# Patient Record
Sex: FEMALE | Race: WHITE | NOT HISPANIC OR LATINO | Employment: UNEMPLOYED | URBAN - NONMETROPOLITAN AREA
[De-identification: names, ages, dates, MRNs, and addresses within clinical notes are randomized per-mention and may not be internally consistent; named-entity substitution may affect disease eponyms.]

---

## 2019-01-07 LAB
EXTERNAL CHLAMYDIA SCREEN: NEGATIVE
EXTERNAL GONORRHEA SCREEN: NEGATIVE
EXTERNAL HEPATITIS B SURFACE ANTIGEN: NEGATIVE
EXTERNAL RUBELLA QUALITATIVE: NORMAL
EXTERNAL SYPHILIS RPR SCREEN: NORMAL
HIV1 P24 AG SERPL QL IA: NORMAL

## 2019-06-18 LAB — EXTERNAL GROUP B STREP ANTIGEN: NEGATIVE

## 2019-07-15 PROCEDURE — 86658 ENTEROVIRUS ANTIBODY: CPT | Performed by: NURSE PRACTITIONER

## 2019-07-15 PROCEDURE — 87252 VIRUS INOCULATION TISSUE: CPT | Performed by: NURSE PRACTITIONER

## 2019-07-22 ENCOUNTER — LAB REQUISITION (OUTPATIENT)
Dept: LAB | Facility: HOSPITAL | Age: 39
End: 2019-07-22

## 2019-07-22 DIAGNOSIS — Z00.00 ENCOUNTER FOR GENERAL ADULT MEDICAL EXAMINATION WITHOUT ABNORMAL FINDINGS: ICD-10-CM

## 2019-07-22 DIAGNOSIS — O61.0 FAILED MEDICAL INDUCTION OF LABOR: Primary | ICD-10-CM

## 2019-07-22 LAB
ABO GROUP BLD: NORMAL
BASOPHILS # BLD AUTO: 0.03 10*3/MM3 (ref 0–0.2)
BASOPHILS NFR BLD AUTO: 0.3 % (ref 0–2)
BLD GP AB SCN SERPL QL: NEGATIVE
DEPRECATED RDW RBC AUTO: 46.2 FL (ref 40–54)
EOSINOPHIL # BLD AUTO: 0.05 10*3/MM3 (ref 0–0.7)
EOSINOPHIL NFR BLD AUTO: 0.5 % (ref 0–4)
ERYTHROCYTE [DISTWIDTH] IN BLOOD BY AUTOMATED COUNT: 15.5 % (ref 12–15)
HCT VFR BLD AUTO: 38 % (ref 37–47)
HGB BLD-MCNC: 11.7 G/DL (ref 12–16)
IMM GRANULOCYTES # BLD AUTO: 0.1 10*3/MM3 (ref 0–0.05)
IMM GRANULOCYTES NFR BLD AUTO: 1.1 % (ref 0–5)
LYMPHOCYTES # BLD AUTO: 1.85 10*3/MM3 (ref 0.72–4.86)
LYMPHOCYTES NFR BLD AUTO: 19.9 % (ref 15–45)
MCH RBC QN AUTO: 25.5 PG (ref 28–32)
MCHC RBC AUTO-ENTMCNC: 30.8 G/DL (ref 33–36)
MCV RBC AUTO: 83 FL (ref 82–98)
MONOCYTES # BLD AUTO: 0.58 10*3/MM3 (ref 0.19–1.3)
MONOCYTES NFR BLD AUTO: 6.2 % (ref 4–12)
NEUTROPHILS # BLD AUTO: 6.7 10*3/MM3 (ref 1.87–8.4)
NEUTROPHILS NFR BLD AUTO: 72 % (ref 39–78)
NRBC BLD AUTO-RTO: 0 /100 WBC (ref 0–0.2)
PLATELET # BLD AUTO: 226 10*3/MM3 (ref 130–400)
PMV BLD AUTO: 11.8 FL (ref 6–12)
RBC # BLD AUTO: 4.58 10*6/MM3 (ref 4.2–5.4)
RH BLD: POSITIVE
T&S EXPIRATION DATE: NORMAL
WBC NRBC COR # BLD: 9.31 10*3/MM3 (ref 4.8–10.8)

## 2019-07-22 PROCEDURE — 86900 BLOOD TYPING SEROLOGIC ABO: CPT | Performed by: OBSTETRICS & GYNECOLOGY

## 2019-07-22 PROCEDURE — 86901 BLOOD TYPING SEROLOGIC RH(D): CPT | Performed by: OBSTETRICS & GYNECOLOGY

## 2019-07-22 PROCEDURE — 86850 RBC ANTIBODY SCREEN: CPT | Performed by: OBSTETRICS & GYNECOLOGY

## 2019-07-22 PROCEDURE — 85025 COMPLETE CBC W/AUTO DIFF WBC: CPT | Performed by: OBSTETRICS & GYNECOLOGY

## 2019-07-22 RX ORDER — OXYTOCIN/0.9 % SODIUM CHLORIDE 30/500 ML
2 PLASTIC BAG, INJECTION (ML) INTRAVENOUS
Status: CANCELLED | OUTPATIENT
Start: 2019-07-22

## 2019-07-22 RX ORDER — SODIUM CHLORIDE 0.9 % (FLUSH) 0.9 %
3 SYRINGE (ML) INJECTION EVERY 12 HOURS SCHEDULED
Status: CANCELLED | OUTPATIENT
Start: 2019-07-22

## 2019-07-22 RX ORDER — SODIUM CHLORIDE 0.9 % (FLUSH) 0.9 %
3-10 SYRINGE (ML) INJECTION AS NEEDED
Status: CANCELLED | OUTPATIENT
Start: 2019-07-22

## 2019-07-22 RX ORDER — METHYLERGONOVINE MALEATE 0.2 MG/ML
200 INJECTION INTRAVENOUS ONCE AS NEEDED
Status: CANCELLED | OUTPATIENT
Start: 2019-07-22

## 2019-07-22 RX ORDER — CARBOPROST TROMETHAMINE 250 UG/ML
250 INJECTION, SOLUTION INTRAMUSCULAR AS NEEDED
Status: CANCELLED | OUTPATIENT
Start: 2019-07-22

## 2019-07-22 RX ORDER — LIDOCAINE HYDROCHLORIDE 10 MG/ML
5 INJECTION, SOLUTION EPIDURAL; INFILTRATION; INTRACAUDAL; PERINEURAL AS NEEDED
Status: CANCELLED | OUTPATIENT
Start: 2019-07-22

## 2019-07-22 RX ORDER — ONDANSETRON 2 MG/ML
4 INJECTION INTRAMUSCULAR; INTRAVENOUS EVERY 6 HOURS PRN
Status: CANCELLED | OUTPATIENT
Start: 2019-07-22 | End: 2019-07-24

## 2019-07-22 RX ORDER — MISOPROSTOL 200 UG/1
800 TABLET ORAL AS NEEDED
Status: CANCELLED | OUTPATIENT
Start: 2019-07-22

## 2019-07-22 NOTE — H&P
"      Patient Care Team:  Provider, No Known as PCP - General      History of Present Illness   This is a post date pregnancy admitted for induction of labor    Review of Systems   Constitutional: Positive for activity change and fatigue.   HENT: Negative.    Eyes: Negative.    Respiratory: Negative.    Cardiovascular: Positive for leg swelling.   Gastrointestinal: Negative.    Endocrine: Negative.    Genitourinary: Negative.    Musculoskeletal: Negative.    Skin: Negative.    Allergic/Immunologic: Negative.    Neurological: Negative.    Hematological: Negative.    Psychiatric/Behavioral: Negative.         Past Medical History:  Denies  Past Surgical History:  Denies  Past Gyn History:  Denies  OB History:    FT   Family History:  CAD  Social History     Tobacco Use   • Smoking status: Never Smoker   • Smokeless tobacco: Never Used   Substance Use Topics   • Alcohol use: No     Frequency: Never   • Drug use: No       (Not in a hospital admission)  Allergies:  Patient has no known allergies.  Medications:  PNV    Objective      Vital Signs  BP:  140/88  P;  68  R:  15  Ht:  64\"  Wt:  192       Physical Exam   Constitutional: She is oriented to person, place, and time. She appears well-developed and well-nourished.   HENT:   Head: Atraumatic.   Eyes: EOM are normal.   Neck: Normal range of motion. Neck supple.   Cardiovascular: Normal rate, regular rhythm, normal heart sounds and intact distal pulses.   Pulmonary/Chest: Effort normal and breath sounds normal.   Abdominal: Soft. Bowel sounds are normal.   Genitourinary: Vagina normal.   Genitourinary Comments: Bimanual Exam:  /-2 head well applied   Musculoskeletal: She exhibits edema.   Neurological: She is alert and oriented to person, place, and time.   Skin: Skin is warm and dry.   Psychiatric: She has a normal mood and affect. Her behavior is normal. Judgment and thought content normal.       Results Review:   GBS negative      Assessment & Plan "     Assessment:  1.  Post dates pregnancy  2.  AMA    Plan:  1.  IOL    I discussed the patients findings and my recommendations with patient    Clau Lopez DO  07/22/19  1:08 PM

## 2019-07-24 ENCOUNTER — HOSPITAL ENCOUNTER (INPATIENT)
Facility: HOSPITAL | Age: 39
LOS: 2 days | Discharge: HOME | End: 2019-07-26
Attending: OBSTETRICS & GYNECOLOGY | Admitting: OBSTETRICS & GYNECOLOGY

## 2019-07-24 PROBLEM — O47.9 THREATENED LABOR AT TERM: Status: ACTIVE | Noted: 2019-07-24

## 2019-07-24 PROCEDURE — 0KQM0ZZ REPAIR PERINEUM MUSCLE, OPEN APPROACH: ICD-10-PCS | Performed by: OBSTETRICS & GYNECOLOGY

## 2019-07-24 PROCEDURE — 25010000002 BUTORPHANOL PER 1 MG: Performed by: OBSTETRICS & GYNECOLOGY

## 2019-07-24 RX ORDER — OXYTOCIN/0.9 % SODIUM CHLORIDE 30/500 ML
999 PLASTIC BAG, INJECTION (ML) INTRAVENOUS ONCE
Status: DISCONTINUED | OUTPATIENT
Start: 2019-07-24 | End: 2019-07-24 | Stop reason: HOSPADM

## 2019-07-24 RX ORDER — MISOPROSTOL 200 UG/1
800 TABLET ORAL AS NEEDED
Status: DISCONTINUED | OUTPATIENT
Start: 2019-07-24 | End: 2019-07-24 | Stop reason: HOSPADM

## 2019-07-24 RX ORDER — ONDANSETRON 4 MG/1
4 TABLET, FILM COATED ORAL EVERY 8 HOURS PRN
Status: DISCONTINUED | OUTPATIENT
Start: 2019-07-24 | End: 2019-07-25 | Stop reason: SDUPTHER

## 2019-07-24 RX ORDER — PROMETHAZINE HYDROCHLORIDE 12.5 MG/1
12.5 SUPPOSITORY RECTAL EVERY 6 HOURS PRN
Status: DISCONTINUED | OUTPATIENT
Start: 2019-07-24 | End: 2019-07-25

## 2019-07-24 RX ORDER — PROMETHAZINE HYDROCHLORIDE 25 MG/ML
12.5 INJECTION, SOLUTION INTRAMUSCULAR; INTRAVENOUS EVERY 6 HOURS PRN
Status: DISCONTINUED | OUTPATIENT
Start: 2019-07-24 | End: 2019-07-25

## 2019-07-24 RX ORDER — BISACODYL 10 MG
10 SUPPOSITORY, RECTAL RECTAL DAILY PRN
Status: DISCONTINUED | OUTPATIENT
Start: 2019-07-25 | End: 2019-07-26 | Stop reason: HOSPADM

## 2019-07-24 RX ORDER — PROMETHAZINE HYDROCHLORIDE 25 MG/1
25 TABLET ORAL EVERY 6 HOURS PRN
Status: DISCONTINUED | OUTPATIENT
Start: 2019-07-24 | End: 2019-07-26 | Stop reason: HOSPADM

## 2019-07-24 RX ORDER — OXYTOCIN/0.9 % SODIUM CHLORIDE 30/500 ML
125 PLASTIC BAG, INJECTION (ML) INTRAVENOUS CONTINUOUS PRN
Status: COMPLETED | OUTPATIENT
Start: 2019-07-24 | End: 2019-07-24

## 2019-07-24 RX ORDER — METHYLERGONOVINE MALEATE 0.2 MG/ML
200 INJECTION INTRAVENOUS ONCE
Status: DISCONTINUED | OUTPATIENT
Start: 2019-07-24 | End: 2019-07-25

## 2019-07-24 RX ORDER — CARBOPROST TROMETHAMINE 250 UG/ML
250 INJECTION, SOLUTION INTRAMUSCULAR AS NEEDED
Status: DISCONTINUED | OUTPATIENT
Start: 2019-07-24 | End: 2019-07-24 | Stop reason: HOSPADM

## 2019-07-24 RX ORDER — SODIUM CHLORIDE 0.9 % (FLUSH) 0.9 %
3 SYRINGE (ML) INJECTION EVERY 12 HOURS SCHEDULED
Status: DISCONTINUED | OUTPATIENT
Start: 2019-07-24 | End: 2019-07-25

## 2019-07-24 RX ORDER — CARBOPROST TROMETHAMINE 250 UG/ML
250 INJECTION, SOLUTION INTRAMUSCULAR ONCE
Status: DISCONTINUED | OUTPATIENT
Start: 2019-07-24 | End: 2019-07-25

## 2019-07-24 RX ORDER — CALCIUM CARBONATE 200(500)MG
2 TABLET,CHEWABLE ORAL 3 TIMES DAILY PRN
Status: DISCONTINUED | OUTPATIENT
Start: 2019-07-24 | End: 2019-07-26 | Stop reason: HOSPADM

## 2019-07-24 RX ORDER — SODIUM CHLORIDE 0.9 % (FLUSH) 0.9 %
3-10 SYRINGE (ML) INJECTION AS NEEDED
Status: DISCONTINUED | OUTPATIENT
Start: 2019-07-24 | End: 2019-07-25

## 2019-07-24 RX ORDER — LIDOCAINE HYDROCHLORIDE 20 MG/ML
INJECTION, SOLUTION INFILTRATION; PERINEURAL
Status: COMPLETED
Start: 2019-07-24 | End: 2019-07-24

## 2019-07-24 RX ORDER — ONDANSETRON 4 MG/1
4 TABLET, FILM COATED ORAL EVERY 6 HOURS PRN
Status: DISCONTINUED | OUTPATIENT
Start: 2019-07-24 | End: 2019-07-26 | Stop reason: HOSPADM

## 2019-07-24 RX ORDER — ONDANSETRON 2 MG/ML
4 INJECTION INTRAMUSCULAR; INTRAVENOUS EVERY 6 HOURS PRN
Status: DISCONTINUED | OUTPATIENT
Start: 2019-07-24 | End: 2019-07-25

## 2019-07-24 RX ORDER — IBUPROFEN 800 MG/1
800 TABLET ORAL EVERY 6 HOURS PRN
Status: DISCONTINUED | OUTPATIENT
Start: 2019-07-24 | End: 2019-07-26 | Stop reason: HOSPADM

## 2019-07-24 RX ORDER — DOCUSATE SODIUM 100 MG/1
100 CAPSULE, LIQUID FILLED ORAL 2 TIMES DAILY
Status: DISCONTINUED | OUTPATIENT
Start: 2019-07-25 | End: 2019-07-26 | Stop reason: HOSPADM

## 2019-07-24 RX ORDER — OXYCODONE HYDROCHLORIDE AND ACETAMINOPHEN 5; 325 MG/1; MG/1
1 TABLET ORAL EVERY 4 HOURS PRN
Status: DISCONTINUED | OUTPATIENT
Start: 2019-07-24 | End: 2019-07-26 | Stop reason: HOSPADM

## 2019-07-24 RX ORDER — BUTORPHANOL TARTRATE 1 MG/ML
1 INJECTION, SOLUTION INTRAMUSCULAR; INTRAVENOUS
Status: DISCONTINUED | OUTPATIENT
Start: 2019-07-24 | End: 2019-07-24

## 2019-07-24 RX ORDER — METHYLERGONOVINE MALEATE 0.2 MG/ML
200 INJECTION INTRAVENOUS ONCE AS NEEDED
Status: DISCONTINUED | OUTPATIENT
Start: 2019-07-24 | End: 2019-07-24 | Stop reason: HOSPADM

## 2019-07-24 RX ORDER — LIDOCAINE HYDROCHLORIDE 10 MG/ML
5 INJECTION, SOLUTION EPIDURAL; INFILTRATION; INTRACAUDAL; PERINEURAL AS NEEDED
Status: DISCONTINUED | OUTPATIENT
Start: 2019-07-24 | End: 2019-07-25

## 2019-07-24 RX ORDER — MISOPROSTOL 200 UG/1
600 TABLET ORAL ONCE
Status: DISCONTINUED | OUTPATIENT
Start: 2019-07-24 | End: 2019-07-25

## 2019-07-24 RX ORDER — SODIUM CHLORIDE, SODIUM LACTATE, POTASSIUM CHLORIDE, CALCIUM CHLORIDE 600; 310; 30; 20 MG/100ML; MG/100ML; MG/100ML; MG/100ML
125 INJECTION, SOLUTION INTRAVENOUS CONTINUOUS
Status: DISCONTINUED | OUTPATIENT
Start: 2019-07-24 | End: 2019-07-25

## 2019-07-24 RX ORDER — SODIUM CHLORIDE 0.9 % (FLUSH) 0.9 %
1-10 SYRINGE (ML) INJECTION AS NEEDED
Status: DISCONTINUED | OUTPATIENT
Start: 2019-07-24 | End: 2019-07-25

## 2019-07-24 RX ORDER — OXYTOCIN/0.9 % SODIUM CHLORIDE 30/500 ML
250 PLASTIC BAG, INJECTION (ML) INTRAVENOUS CONTINUOUS
Status: DISPENSED | OUTPATIENT
Start: 2019-07-24 | End: 2019-07-24

## 2019-07-24 RX ADMIN — OXYTOCIN-SODIUM CHLORIDE 0.9% IV SOLN 30 UNIT/500ML 125 ML/HR: 30-0.9/5 SOLUTION at 05:45

## 2019-07-24 RX ADMIN — BUTORPHANOL TARTRATE 1 MG: 1 INJECTION, SOLUTION INTRAMUSCULAR; INTRAVENOUS at 03:50

## 2019-07-24 RX ADMIN — OXYCODONE HYDROCHLORIDE AND ACETAMINOPHEN 1 TABLET: 5; 325 TABLET ORAL at 23:34

## 2019-07-24 RX ADMIN — IBUPROFEN 800 MG: 800 TABLET, FILM COATED ORAL at 19:40

## 2019-07-24 RX ADMIN — SODIUM CHLORIDE, POTASSIUM CHLORIDE, SODIUM LACTATE AND CALCIUM CHLORIDE 125 ML/HR: 600; 310; 30; 20 INJECTION, SOLUTION INTRAVENOUS at 02:20

## 2019-07-24 RX ADMIN — BUTORPHANOL TARTRATE 1 MG: 1 INJECTION, SOLUTION INTRAMUSCULAR; INTRAVENOUS at 05:30

## 2019-07-24 RX ADMIN — LIDOCAINE HYDROCHLORIDE 20 ML: 20 INJECTION, SOLUTION INFILTRATION; PERINEURAL at 05:30

## 2019-07-24 RX ADMIN — IBUPROFEN 800 MG: 800 TABLET, FILM COATED ORAL at 12:57

## 2019-07-25 LAB
DEPRECATED RDW RBC AUTO: 46.2 FL (ref 40–54)
ERYTHROCYTE [DISTWIDTH] IN BLOOD BY AUTOMATED COUNT: 15.7 % (ref 12–15)
HCT VFR BLD AUTO: 28.9 % (ref 37–47)
HGB BLD-MCNC: 9 G/DL (ref 12–16)
MCH RBC QN AUTO: 25.4 PG (ref 28–32)
MCHC RBC AUTO-ENTMCNC: 31.1 G/DL (ref 33–36)
MCV RBC AUTO: 81.6 FL (ref 82–98)
PLATELET # BLD AUTO: 185 10*3/MM3 (ref 130–400)
PMV BLD AUTO: 11.6 FL (ref 6–12)
RBC # BLD AUTO: 3.54 10*6/MM3 (ref 4.2–5.4)
WBC NRBC COR # BLD: 8.48 10*3/MM3 (ref 4.8–10.8)

## 2019-07-25 PROCEDURE — 85027 COMPLETE CBC AUTOMATED: CPT | Performed by: OBSTETRICS & GYNECOLOGY

## 2019-07-25 RX ADMIN — DOCUSATE SODIUM 100 MG: 100 CAPSULE ORAL at 08:24

## 2019-07-25 RX ADMIN — Medication 1 TABLET: at 14:08

## 2019-07-25 RX ADMIN — IBUPROFEN 800 MG: 800 TABLET, FILM COATED ORAL at 15:26

## 2019-07-25 RX ADMIN — POLYETHYLENE GLYCOL 3350 17 G: 17 POWDER, FOR SOLUTION ORAL at 08:24

## 2019-07-25 RX ADMIN — OXYCODONE HYDROCHLORIDE AND ACETAMINOPHEN 1 TABLET: 5; 325 TABLET ORAL at 16:15

## 2019-07-25 RX ADMIN — IBUPROFEN 800 MG: 800 TABLET, FILM COATED ORAL at 06:59

## 2019-07-25 RX ADMIN — DOCUSATE SODIUM 100 MG: 100 CAPSULE ORAL at 20:01

## 2019-07-25 RX ADMIN — IBUPROFEN 800 MG: 800 TABLET, FILM COATED ORAL at 21:05

## 2019-07-26 VITALS
WEIGHT: 191.6 LBS | RESPIRATION RATE: 16 BRPM | HEART RATE: 84 BPM | OXYGEN SATURATION: 99 % | SYSTOLIC BLOOD PRESSURE: 116 MMHG | BODY MASS INDEX: 33.95 KG/M2 | HEIGHT: 63 IN | DIASTOLIC BLOOD PRESSURE: 68 MMHG | TEMPERATURE: 97.3 F

## 2019-07-26 PROBLEM — O47.9 THREATENED LABOR AT TERM: Status: RESOLVED | Noted: 2019-07-24 | Resolved: 2019-07-26

## 2019-07-26 RX ORDER — IBUPROFEN 800 MG/1
800 TABLET ORAL EVERY 6 HOURS PRN
Qty: 50 TABLET | Refills: 0 | Status: SHIPPED | OUTPATIENT
Start: 2019-07-26 | End: 2019-08-25

## 2019-07-26 RX ORDER — OXYCODONE HYDROCHLORIDE AND ACETAMINOPHEN 5; 325 MG/1; MG/1
1 TABLET ORAL EVERY 4 HOURS PRN
Qty: 20 TABLET | Refills: 0 | Status: SHIPPED | OUTPATIENT
Start: 2019-07-26 | End: 2019-08-03

## 2019-07-26 RX ADMIN — Medication: at 09:04

## 2019-07-26 RX ADMIN — POLYETHYLENE GLYCOL 3350 17 G: 17 POWDER, FOR SOLUTION ORAL at 08:54

## 2019-07-26 RX ADMIN — DOCUSATE SODIUM 100 MG: 100 CAPSULE ORAL at 08:54

## 2019-07-26 RX ADMIN — CALCIUM CARBONATE 2 TABLET: 500 TABLET, CHEWABLE ORAL at 09:05

## 2019-07-26 RX ADMIN — IBUPROFEN 800 MG: 800 TABLET, FILM COATED ORAL at 09:04

## 2019-07-26 RX ADMIN — Medication 1 TABLET: at 08:54

## 2019-07-26 RX ADMIN — IBUPROFEN 800 MG: 800 TABLET, FILM COATED ORAL at 02:58

## 2019-07-27 ENCOUNTER — HOSPITAL ENCOUNTER (OUTPATIENT)
Dept: LACTATION | Facility: HOSPITAL | Age: 39
Discharge: HOME | End: 2019-07-27

## 2019-07-27 NOTE — LACTATION NOTE
"Mother's Name: Yolie \"Bryan\"  Contact Number:[phone]  G/P:2/2  Breastfeeding Hx: Breast fed first child for 10 months - milk dried up with second pregnancy  Significant Medical History: AMA  Maternal Breast Assessment: Breast are full, some areas of firmness especially to right breast right upper quadrant, nipples are tender, mother states nipples were bleeding yesterday but no significant bruising or scabs visible.    Infant's Name:Kailey  Date of Birth: [birth year]  Gestational age at Birth: 39.6  Age:3 days  Physician: Dr. Rondon                     Reason for Visit: Initial lactation assessment s/p discharge          Infant's Birth weight: 9-5.9 (4250g)  Previous Weight: 8-11.5 (3956g)  Wt Loss: -6.92%    Today's Weight:   8-10 (3911g) Wt Loss:-7.97%    Feeding History Since Discharge/Last Lactation Appt.:Mother states infant has  8 times in the past 24 hours. Infant initially has been nursing bilateral breast for each feeding for 15/15 but mother states infant was having emesis episodes with this. She began breastfeeding 1 side per feeding for 15 mins and infant has not had any more emesis, he is resting well after feedings.     Past 24 Hours Voids/Stools:4/4       Color of Stool: yellow    Pre Weight: 8-10.7 (3931g)           Left Breast:       8 mins  8-13.3 (4005g)      Right Breast:    16 mins  8-12 (3970g)                     Total Minutes:     24 mins        Total Weight Gain:    74      gms    Average Feeding Amount for Age: 0.5-1 oz (15-30ml)    Interventions:After AC weight obtained, infant placed in mother's arms, mother independently positions infant to right breast in football hold, infant's chin in contact with his chest, assisted with adjusting infant position to lift head. Mother independently latches infant but latch appears shallow and mother verbalized discomfort. Assisted with releasing infant from breast, demonstrated proper \"sandwiching\" of breast tissue, rolling tissue into " "infant's wide gaping mouth. Infant latched easily, flanged lips exhibited, mother verbalized continued discomfort but this improved after approximately 1 min. Infant exhibited deep jaw drops and audible swallows, required stimulation periodically for sucks, but easily stimulated. Mother was able to appropriately and independently position and latch infant to left breast in cradle hold, assisted with turning infant inward \"belly to belly\". Mother again verbalized slight discomfort with latch initially but quickly resolved. Throughout feeding infant appeared to be slipping downward, at end of feeding mother's left nipple is lipstick shaped.     Education: Well supportive pillows while nursing infant to aid in holding infant up to breast.  Practice allowing infant to gape wide to achieve deep latch  Nipple care  Pump after feedings if knots remain present  Engorgement/Mastitis    Notified MD/ Orders Received:NA    Feeding Plan: Continue nursing infant on demand or at least waking to feed every 3 hours. Offer infant bilateral breast. Continue to alternate which breast you start with.    Plan of Care:    Interventions accomplished satisfactorily, requires no further action.      Future Appointments:    Lactation: At mother's [facility]    Physician: Dr. Rondon, July 31st     Signature: Christiane Zamora, RN, Lactation nurse    Faxed to:[facility]- Dr. Rondon  Date: 7/27/19      "

## 2019-07-30 ENCOUNTER — TELEPHONE (OUTPATIENT)
Dept: OTHER | Facility: HOSPITAL | Age: 39
End: 2019-07-30

## 2019-07-30 NOTE — TELEPHONE ENCOUNTER
"Infant;  Caius (\"Maxwell-us\")  :  19  6 days    Bryan reports infant bfing well, but nipples are sore with scab to R. She is using shells and lanolin. She also admits to feeding infant with a shallow latch at times. Educated strongly against this. Described how to latch deeply and urged her to watch videos of same; relatching as often as needed to have pain-free bfing. Also suggested EBM onto sore nipples and air-drying. Offered another OP appointment if desired. Pt declined. Says infant is almost back to birth weight. Invited to [employer] bfing group. Mom states will likely come. Praised her and encouraged her to cont bfing.   "

## 2020-07-10 ENCOUNTER — TELEMEDICINE (OUTPATIENT)
Dept: OBGYN | Age: 40
End: 2020-07-10
Payer: COMMERCIAL

## 2020-07-10 PROCEDURE — 99203 OFFICE O/P NEW LOW 30 MIN: CPT | Performed by: NURSE PRACTITIONER

## 2020-07-10 RX ORDER — ONDANSETRON 4 MG/1
4 TABLET, ORALLY DISINTEGRATING ORAL EVERY 8 HOURS PRN
Qty: 45 TABLET | Refills: 3 | Status: SHIPPED | OUTPATIENT
Start: 2020-07-10 | End: 2020-09-08

## 2020-07-10 ASSESSMENT — ENCOUNTER SYMPTOMS
GASTROINTESTINAL NEGATIVE: 1
RESPIRATORY NEGATIVE: 1
EYES NEGATIVE: 1

## 2020-07-10 NOTE — PROGRESS NOTES
[facility]  Nurse Practitioner Office Note  TELEHEALTH EVALUATION -- Audio/Visual (During QVLCT-00 public health emergency)    aCmacho Velásquez is a 47 y.o. female who presents today for her medical conditions/ complaints as noted below. Chief Complaint   Patient presents with   [facility] Establish Care    Confirmation         HPI  Patient has had one +HPT  LMP 20    Other pregnancies normal, full term 's. Just finished breastfeeding her youngest. Has had some light spotting that is intermittent, but says this happened before. States blood type is O+. There is no problem list on file for this patient. No LMP recorded. [id number]    History reviewed. No pertinent past medical history. History reviewed. No pertinent surgical history. History reviewed. No pertinent family history. Social History     Tobacco Use    Smoking status: Never Smoker    Smokeless tobacco: Never Used   Substance Use Topics    Alcohol use: Not Currently       Current Outpatient Medications   Medication Sig Dispense Refill    ondansetron (ZOFRAN ODT) 4 MG disintegrating tablet Take 1 tablet by mouth every 8 hours as needed for Nausea or Vomiting 45 tablet 3     No current facility-administered medications for this visit. No Known Allergies  There were no vitals filed for this visit. There is no height or weight on file to calculate BMI. Review of Systems   Constitutional: Negative. HENT: Negative. Eyes: Negative. Respiratory: Negative. Cardiovascular: Negative. Gastrointestinal: Negative. Genitourinary: Positive for vaginal bleeding (spotting). Negative for difficulty urinating, dyspareunia, dysuria, enuresis, frequency, hematuria, menstrual problem, pelvic pain, urgency and vaginal discharge. Musculoskeletal: Negative. Skin: Negative. Neurological: Negative. Psychiatric/Behavioral: Negative.         Due to this being a TeleHealth encounter, evaluation of the following organ systems is limited: Vitals/Constitutional/EENT/Resp/CV/GI//MS/Neuro/Skin/Heme-Lymph-Imm. Physical Exam  Vitals signs and nursing note reviewed. Constitutional:       General: She is not in acute distress. Appearance: She is well-developed. She is not diaphoretic. HENT:      Head: Normocephalic and atraumatic. Eyes:      Conjunctiva/sclera: Conjunctivae normal.      Pupils: Pupils are equal, round, and reactive to light. Neck:      Musculoskeletal: Normal range of motion. Pulmonary:      Effort: Pulmonary effort is normal.   Abdominal:      Tenderness: There is no guarding. Musculoskeletal: Normal range of motion. Comments: Normal ROM in all 4 extremities; normal gait   Skin:     General: Skin is warm and dry. Neurological:      Mental Status: She is alert and oriented to person, place, and time. Motor: No abnormal muscle tone. Coordination: Coordination normal.   Psychiatric:         Behavior: Behavior normal.          Diagnosis Orders   1. Amenorrhea  HCG, QUANTITATIVE, PREGNANCY   2. Bleeding in early pregnancy  PROGESTERONE   3. Nausea and vomiting in pregnancy  ondansetron (ZOFRAN ODT) 4 MG disintegrating tablet       MEDICATIONS:  Orders Placed This Encounter   Medications    ondansetron (ZOFRAN ODT) 4 MG disintegrating tablet     Sig: Take 1 tablet by mouth every 8 hours as needed for Nausea or Vomiting     Dispense:  45 tablet     Refill:  3       ORDERS:  Orders Placed This Encounter   Procedures    HCG, QUANTITATIVE, PREGNANCY    PROGESTERONE       PLAN:  1. Pregnancy concerns discussed  2. zofran sent  3. Check quant, US and nob asap due to spotting  4.  If gets heavy over weekend go to ER     Pursuant to the emergency declaration under the [facility], 1135 waiver authority and the [employer] Act, this Virtual  Visit was conducted, with patient's consent, to reduce the patient's risk of exposure to COVID-19 and provide continuity of care for an established patient. Services were provided through a video synchronous discussion virtually to substitute for in-person clinic visit.

## 2020-07-13 ENCOUNTER — HOSPITAL ENCOUNTER (OUTPATIENT)
Dept: ULTRASOUND IMAGING | Age: 40
Discharge: HOME | End: 2020-07-13
Payer: COMMERCIAL

## 2020-07-13 ENCOUNTER — INITIAL PRENATAL (OUTPATIENT)
Dept: OBGYN | Age: 40
End: 2020-07-13

## 2020-07-13 VITALS — WEIGHT: 167 LBS | HEART RATE: 90 BPM | SYSTOLIC BLOOD PRESSURE: 119 MMHG | DIASTOLIC BLOOD PRESSURE: 73 MMHG

## 2020-07-13 DIAGNOSIS — Z36.9 ANTENATAL SCREENING ENCOUNTER: ICD-10-CM

## 2020-07-13 DIAGNOSIS — O20.9 BLEEDING IN EARLY PREGNANCY: ICD-10-CM

## 2020-07-13 DIAGNOSIS — N91.2 AMENORRHEA: ICD-10-CM

## 2020-07-13 LAB
ABO/RH: NORMAL
ANTIBODY SCREEN: NORMAL
GONADOTROPIN, CHORIONIC (HCG) QUANT: ABNORMAL MIU/ML (ref 0–5.3)
PROGESTERONE LEVEL: 20.16 NG/ML

## 2020-07-13 PROCEDURE — 76817 TRANSVAGINAL US OBSTETRIC: CPT

## 2020-07-13 PROCEDURE — 0500F INITIAL PRENATAL CARE VISIT: CPT | Performed by: NURSE PRACTITIONER

## 2020-07-13 NOTE — PATIENT INSTRUCTIONS
Pregnancy: Care Instructions. \"     If you do not have an account, please click on the \"Sign Up Now\" link. Current as of: February 11, 2020               Content Version: 12.5  © 6307-2837 Healthwise, Incorporated. Care instructions adapted under license by [facility] ([facility]). If you have questions about a medical condition or this instruction, always ask your healthcare professional. [address] any warranty or liability for your use of this information.

## 2020-07-13 NOTE — PROGRESS NOTES
Culture, Urine    C.trachomatis N.gonorrhoeae DNA    Varicella Zoster Antibody, IgG   3. 8 weeks gestation of pregnancy     4. Nausea and vomiting in pregnancy         P:   Pt counseled on cata, continue nausea med and Genetic testing  Continue with routine prenatal care. RTC in 4 wk for prenatal visit in office for panorama    MEDICATIONS:  No orders of the defined types were placed in this encounter.       ORDERS:  Orders Placed This Encounter   Procedures    Culture, Urine    C.trachomatis N.gonorrhoeae DNA    HIV Obstetric Panel    Varicella Zoster Antibody, IgG

## 2020-07-14 ENCOUNTER — PATIENT MESSAGE (OUTPATIENT)
Dept: OBGYN | Age: 40
End: 2020-07-14

## 2020-07-14 NOTE — TELEPHONE ENCOUNTER
From: Edin Mcghee  To: Sulaiman PerryANG  Sent: 7/14/2020 1:43 PM CDT  Subject: Non-Urgent Medical Question    The lady at the desk, Priyanka Willett I think, asked me to message in [employer] about billing that she could send the information to me through here. I would like a breakdown of what I will be charged after I deliver. I do realize tests prior and any extra ultrasounds will be separate. But we'd like to set our expectations now before the delivery.      Thanks,   Edin Mcghee

## 2020-07-14 NOTE — TELEPHONE ENCOUNTER
Susan: The estimated cost for a vaginal delivery is $2115.00. The estimated cost for a c/section is $ 4672.00    You may want to contact your insurance company and see if they can tell you how much they will cover.      Chloe Salazar, [address] OB/GYN

## 2020-07-16 LAB
BASOPHILS ABSOLUTE: 0 K/UL (ref 0–0.2)
BASOPHILS RELATIVE PERCENT: 0.2 % (ref 0–1)
EOSINOPHILS ABSOLUTE: 0.1 K/UL (ref 0–0.6)
EOSINOPHILS RELATIVE PERCENT: 0.9 % (ref 0–5)
HCT VFR BLD CALC: 42.8 % (ref 37–47)
HEMOGLOBIN: 13.8 G/DL (ref 12–16)
HEPATITIS B SURFACE ANTIGEN INTERPRETATION: ABNORMAL
HIV-1 P24 AG: ABNORMAL
IMMATURE GRANULOCYTES #: 0 K/UL
LYMPHOCYTES ABSOLUTE: 1.6 K/UL (ref 1.1–4.5)
LYMPHOCYTES RELATIVE PERCENT: 18.7 % (ref 20–40)
MCH RBC QN AUTO: 28.8 PG (ref 27–31)
MCHC RBC AUTO-ENTMCNC: 32.2 G/DL (ref 33–37)
MCV RBC AUTO: 89.2 FL (ref 81–99)
MONOCYTES ABSOLUTE: 0.5 K/UL (ref 0–0.9)
MONOCYTES RELATIVE PERCENT: 5.5 % (ref 0–10)
NEUTROPHILS ABSOLUTE: 6.3 K/UL (ref 1.5–7.5)
NEUTROPHILS RELATIVE PERCENT: 74.5 % (ref 50–65)
PDW BLD-RTO: 12.5 % (ref 11.5–14.5)
PLATELET # BLD: 308 K/UL (ref 130–400)
PMV BLD AUTO: 9.9 FL (ref 9.4–12.3)
RAPID HIV 1&2: ABNORMAL
RBC # BLD: 4.8 M/UL (ref 4.2–5.4)
RPR: ABNORMAL
RUBELLA ANTIBODY IGG: REACTIVE
WBC # BLD: 8.5 K/UL (ref 4.8–10.8)

## 2020-07-20 LAB — VZV IGG SER QL IA: 2.52

## 2020-08-11 ENCOUNTER — ROUTINE PRENATAL (OUTPATIENT)
Dept: OBGYN | Age: 40
End: 2020-08-11

## 2020-08-11 VITALS
SYSTOLIC BLOOD PRESSURE: 128 MMHG | HEIGHT: 64 IN | DIASTOLIC BLOOD PRESSURE: 70 MMHG | BODY MASS INDEX: 28.51 KG/M2 | HEART RATE: 88 BPM | WEIGHT: 167 LBS

## 2020-08-11 PROCEDURE — 0502F SUBSEQUENT PRENATAL CARE: CPT | Performed by: OBSTETRICS & GYNECOLOGY

## 2020-08-11 RX ORDER — DOXYLAMINE SUCCINATE AND PYRIDOXINE HYDROCHLORIDE, DELAYED RELEASE TABLETS 10 MG/10 MG 10; 10 MG/1; MG/1
TABLET, DELAYED RELEASE ORAL
Qty: 120 TABLET | Refills: 5 | Status: SHIPPED | OUTPATIENT
Start: 2020-08-11 | End: 2020-09-08

## 2020-08-11 RX ORDER — ONDANSETRON 4 MG/1
4 TABLET, ORALLY DISINTEGRATING ORAL EVERY 8 HOURS PRN
Qty: 30 TABLET | Refills: 5 | Status: SHIPPED | OUTPATIENT
Start: 2020-08-11 | End: 2020-09-08

## 2020-08-11 NOTE — PATIENT INSTRUCTIONS
thickness of the area at the back of the baby's neck. An increase in the thickness can be an early sign of Down syndrome. ? Chorionic villus sampling (CVS). This is a test that looks for certain genetic problems with your baby. The same genes that are in your baby are in the placenta. A small piece of the placenta is taken out and tested. This test is done when you are 10 to 13 weeks pregnant. Ease discomfort  · Slow down and take naps when you feel tired. · If your emotions swing, talk to someone. Crying, anxiety, and concentration problems are common. · If your gums bleed, try a softer toothbrush. If your gums are puffy and bleed a lot, see your dentist.  · If you feel dizzy:  ? Get up slowly after sitting or lying down. ? Drink plenty of fluids. ? Eat small snacks to keep your blood sugar stable. ? Put your head between your legs as though you were tying your shoelaces. ? Lie down with your legs higher than your head. Use pillows to prop up your feet. · If you have a headache:  ? Lie down. ? Ask your partner or a good friend for a neck massage. ? Try cool cloths over your forehead or across the back of your neck. ? Use acetaminophen (Tylenol) for pain relief. Do not use nonsteroidal anti-inflammatory drugs (NSAIDs), such as ibuprofen (Advil, Motrin) or naproxen (Aleve), unless your doctor says it is okay. · If you have a nosebleed, pinch your nose gently, and hold it for a short while. To prevent nosebleeds, try massaging a small dab of petroleum jelly, such as Vaseline, in your nostrils. · If your nose is stuffed up, try saline (saltwater) nose sprays. Do not use decongestant sprays. Care for your breasts  · Wear a bra that gives you good support. · Know that changes in your breasts are normal.  ? Your breasts may get larger and more tender. Tenderness usually gets better by 12 weeks. ? Your nipples may get darker and larger, and small bumps around your nipples may show more. ?  The veins in your chest and breasts may show more. · Don't worry about \"toughening'\" your nipples. Breastfeeding will naturally do this. Where can you learn more? Go to https://[email or web address] org and sign in to your CSR account. Enter N606 in the [employer] box to learn more about \"Weeks 10 to 14 of Your Pregnancy: Care Instructions. \"     If you do not have an account, please click on the \"Sign Up Now\" link. Current as of: February 11, 2020               Content Version: 12.5  © 3195-4642 Healthwise, Incorporated. Care instructions adapted under license by [facility] ([facility]). If you have questions about a medical condition or this instruction, always ask your healthcare professional. [address] any warranty or liability for your use of this information.

## 2020-08-11 NOTE — PROGRESS NOTES
Pt presents today for a routine prenatal visit. Pt denies vaginal bleeding, leaking of fluid or cramping.  Genetic testing today

## 2020-08-11 NOTE — PROGRESS NOTES
I, Anna Chou RN, am scribing for and in the presence of Dr. Bailey Fall 8/11/2020/4:26 PM/sign      Subjective:  Nilam Roy is here for a return obstetrical visit. Today she is 12w6d weeks EGA. She is having N/V. She  does not have vaginal bleeding, dizziness, or cramping. Objective: Mother's Prenatal Vitals  BP: 128/70  Weight: 167 lb (75.8 kg)  Pulse: 88  Patient Position: Sitting  Prenatal Fetal Information  Fetal Heart Rate: 166  Movement: Absent  Pt is A&Ox3, in no acute distress. Normocephalic, atraumatic. PERRL. Resp even and non-labored. Skin pink, warm & dry. Gravid abdomen. THOMAS's well. Gait steady. Assessment:    IUP at 12w6d wks      Diagnosis Orders   1. Antepartum multigravida of advanced maternal age     3. Nausea and vomiting in pregnancy       Plan:  Pt counseled on SAB and genetic testing. Will call Toan Tillman about [facility] testing cost. Will call if she desires testing. If not covered then will draw Quad screen  Zofran and Diclegis rx sent in  Continue with routine prenatal care. RTC in 4 wks for prenatal visit  MEDICATIONS:  Orders Placed This Encounter   Medications    doxylamine-pyridoxine 10-10 MG TBEC     Sig: Take 1 tab by mouth nightly at bedtime. May take 1 tab in am and 1 tab in afternoon if needed. Dispense:  120 tablet     Refill:  5    ondansetron (ZOFRAN ODT) 4 MG disintegrating tablet     Sig: Take 1 tablet by mouth every 8 hours as needed for Nausea or Vomiting     Dispense:  30 tablet     Refill:  5     ORDERS:  No orders of the defined types were placed in this encounter. Hayden Duenas MD, personally performed services described in this document as scribed by Tim Canchola RN in my presence, and it is both accurate and complete.

## 2020-09-08 ENCOUNTER — ROUTINE PRENATAL (OUTPATIENT)
Dept: OBGYN | Age: 40
End: 2020-09-08

## 2020-09-08 VITALS
HEART RATE: 94 BPM | SYSTOLIC BLOOD PRESSURE: 117 MMHG | DIASTOLIC BLOOD PRESSURE: 73 MMHG | WEIGHT: 169 LBS | BODY MASS INDEX: 29.01 KG/M2

## 2020-09-08 PROCEDURE — 0502F SUBSEQUENT PRENATAL CARE: CPT | Performed by: NURSE PRACTITIONER

## 2020-09-08 NOTE — PATIENT INSTRUCTIONS

## 2020-09-08 NOTE — PROGRESS NOTES
Mary Ann Hernandez is here for a return obstetrical visit. Today she is 16w6d weeks EGA. She is doing well and has no complaints. She  does not have vaginal bleeding, leaking of fluid, contractions. She does not have blurred vision, SOB, or increased swelling in legs or face. Pt does not feel fetal movement regularly. Has appt scheduled for anatomical survey. Objective: Mother's Prenatal Vitals  BP: 117/73  Weight: 169 lb (76.7 kg)  Pulse: 94  Patient Position: Sitting  Alb/Glu  Albumin: Negative  Glucose: Negative  Prenatal Fetal Information  Fetal Heart Rate: 150/US  Movement: Absent  Pt is A&Ox3, in no acute distress. Normocephalic, atraumatic. PERRL. Resp even and non-labored. Skin pink, warm & dry. Gravid abdomen. THOMAS's well. Gait steady. Assessment:  IUP at 16w6d wks      Diagnosis Orders   1. 16 weeks gestation of pregnancy       Plan:Pt counseled on Kick count and  labor  Continue with routine prenatal care. RTC in 4 wk for prenatal visit    MEDICATIONS:  No orders of the defined types were placed in this encounter. ORDERS:  No orders of the defined types were placed in this encounter.

## 2020-10-13 ENCOUNTER — TELEMEDICINE (OUTPATIENT)
Dept: OBGYN | Age: 40
End: 2020-10-13

## 2020-10-13 PROCEDURE — 0502F SUBSEQUENT PRENATAL CARE: CPT | Performed by: OBSTETRICS & GYNECOLOGY

## 2020-10-13 NOTE — PROGRESS NOTES
at 21w6d wks      Diagnosis Orders   1. Multigravida of advanced maternal age in second trimester  Glucose 1 Hour Postprandial    CBC      Pt counseled on balanced nutrition, adequate fluid intake, taking PNV daily, and exercise along with GHTN precautions and  labor   Continue with routine prenatal care.  RTC in 4 wks for prenatal visit with GCT    Patient identification was verified at the start of the visit: Yes    Total time spent on this encounter: 10 mins     Pursuant to the emergency declaration under the [address] [place] [address], [facility] waiver authority and the [employer] and [employer] Act, this Virtual  Visit was conducted, with patient's consent, to reduce the patient's risk of exposure to COVID-19 and provide continuity of care for an established patient. Services were provided through a video synchronous discussion virtually to substitute for in-person clinic visit. Sudha Villarreal MD, personally performed services described in this document as scribed by Kriss Burton RN in my presence, and it is both accurate and complete.

## 2020-11-11 ENCOUNTER — ROUTINE PRENATAL (OUTPATIENT)
Dept: OBGYN | Age: 40
End: 2020-11-11
Payer: COMMERCIAL

## 2020-11-11 VITALS
HEART RATE: 100 BPM | BODY MASS INDEX: 30.21 KG/M2 | SYSTOLIC BLOOD PRESSURE: 119 MMHG | WEIGHT: 176 LBS | DIASTOLIC BLOOD PRESSURE: 73 MMHG

## 2020-11-11 DIAGNOSIS — O09.522 MULTIGRAVIDA OF ADVANCED MATERNAL AGE IN SECOND TRIMESTER: ICD-10-CM

## 2020-11-11 LAB
GLUCOSE, 1HR PP: 118 MG/DL (ref 75–140)
HCT VFR BLD CALC: 36.9 % (ref 37–47)
HEMOGLOBIN: 12 G/DL (ref 12–16)
MCH RBC QN AUTO: 28.8 PG (ref 27–31)
MCHC RBC AUTO-ENTMCNC: 32.5 G/DL (ref 33–37)
MCV RBC AUTO: 88.5 FL (ref 81–99)
PDW BLD-RTO: 12.7 % (ref 11.5–14.5)
PLATELET # BLD: 271 K/UL (ref 130–400)
PMV BLD AUTO: 10.4 FL (ref 9.4–12.3)
RBC # BLD: 4.17 M/UL (ref 4.2–5.4)
WBC # BLD: 9.2 K/UL (ref 4.8–10.8)

## 2020-11-11 PROCEDURE — 90471 IMMUNIZATION ADMIN: CPT | Performed by: OBSTETRICS & GYNECOLOGY

## 2020-11-11 PROCEDURE — 90686 IIV4 VACC NO PRSV 0.5 ML IM: CPT | Performed by: OBSTETRICS & GYNECOLOGY

## 2020-11-11 PROCEDURE — 0502F SUBSEQUENT PRENATAL CARE: CPT | Performed by: OBSTETRICS & GYNECOLOGY

## 2020-11-11 NOTE — PATIENT INSTRUCTIONS
Patient Education        Weeks 26 to 30 of Your Pregnancy: Care Instructions  Your Care Instructions     You are now in your last trimester of pregnancy. Your baby is growing rapidly. And you'll probably feel your baby moving around more often. Your doctor may ask you to count your baby's kicks. Your back may ache as your body gets used to your baby's size and length. If you haven't already had the Tdap shot during this pregnancy, talk to your doctor about getting it. It will help protect your  against pertussis infection. During this time, it's important to take care of yourself and pay attention to what your body needs. If you feel sexual, explore ways to be close with your partner that match your comfort and desire. Use the tips provided in this care sheet to find ways to be sexual in your own way. Follow-up care is a key part of your treatment and safety. Be sure to make and go to all appointments, and call your doctor if you are having problems. It's also a good idea to know your test results and keep a list of the medicines you take. How can you care for yourself at home? Take it easy at work  · Take frequent breaks. If possible, stop working when you are tired, and rest during your lunch hour. · Take bathroom breaks every 2 hours. · Change positions often. If you sit for long periods, stand up and walk around. · When you stand for a long time, keep one foot on a low stool with your knee bent. After standing a lot, sit with your feet up. · Avoid fumes, chemicals, and tobacco smoke. Be sexual in your own way  · Having sex during pregnancy is okay, unless your doctor tells you not to. · You may be very interested in sex, or you may have no interest at all. · Your growing belly can make it hard to find a good position during intercourse. [place] and explore. · You may get cramps in your uterus when your partner touches your breasts.   · A back rub may relieve the backache or cramps that Healthwise, Incorporated. Care instructions adapted under license by [facility] ([facility]). If you have questions about a medical condition or this instruction, always ask your healthcare professional. [address] any warranty or liability for your use of this information.

## 2020-11-11 NOTE — PROGRESS NOTES
IAnna RN, am scribing for and in the presence of Dr. Adriano Avalos 10:18 AM/sign      Subjective:  Yuvonne Fothergill is here for a return obstetrical visit. Today she is 26w0d weeks EGA. She is doing well, taking her PNV as directed, and has no complaints. She  does not have vaginal bleeding, leaking , contractions, syncope, or headaches. Pt does feel fetal movement regularly. GCT done today. Objective: Mother's Prenatal Vitals  BP: 119/73  Weight: 176 lb (79.8 kg)  Pulse: 100  Prenatal Fetal Information  Fundal Height (cm): 26 cm  Fetal Heart Rate: 134/US  Movement: Present  Pt is A&Ox3, in no acute distress. Normocephalic, atraumatic. PERRL. Resp even and non-labored. Skin pink, warm & dry. Gravid abdomen. THOMAS's well. Gait steady. Assessment:    IUP at 26w0d wks      Diagnosis Orders   1. Multigravida of advanced maternal age in second trimester     2. Flu vaccine need  INFLUENZA, QUADV, 3 YRS AND OLDER, IM PF, PREFILL SYR OR SDV, 0.5ML (AFLURIA QUADV, PF)     Plan:  Pt counseled on balanced nutrition, adequate fluid intake, taking PNV daily, and exercise along with GHTN precautions and  labor   Pt received Flu vaccine today. Continue with routine prenatal care. RTC in 2 wks for prenatal visit    MEDICATIONS:  No orders of the defined types were placed in this encounter. ORDERS:  Orders Placed This Encounter   Procedures    INFLUENZA, QUADV, 3 YRS AND OLDER, IM PF, PREFILL SYR OR SDV, 0.5ML (Lisa Early, PF)     Adriano LAWRENCE MD, personally performed services described in this document as scribed by Lucero Reina RN in my presence, and it is both accurate and complete.

## 2020-11-11 NOTE — PROGRESS NOTES
Pt presents today for a routine prenatal visit. Pt denies vaginal bleeding, leaking of fluid or cramping. Pt states + fetal movement. 1hr GTT and 3D U/S done today  After obtaining consent, and per orders of Dr. Hari Marquis, injection of Afluria given in Right deltoid by Gayle Lee. Patient instructed to remain in clinic for 20 minutes afterwards, and to report any adverse reaction to me immediately.

## 2020-11-25 ENCOUNTER — ROUTINE PRENATAL (OUTPATIENT)
Dept: OBGYN | Age: 40
End: 2020-11-25

## 2020-11-25 VITALS
HEART RATE: 102 BPM | DIASTOLIC BLOOD PRESSURE: 77 MMHG | SYSTOLIC BLOOD PRESSURE: 128 MMHG | BODY MASS INDEX: 31.07 KG/M2 | WEIGHT: 181 LBS

## 2020-11-25 PROCEDURE — 0502F SUBSEQUENT PRENATAL CARE: CPT | Performed by: OBSTETRICS & GYNECOLOGY

## 2020-11-25 NOTE — PROGRESS NOTES
I, Anna Chou RN, am scribing for and in the presence of Dr. Madhav Saeed 11:10 AM/sign      Subjective:  Atnonio Stallings is here for a return obstetrical visit. Today she is 28w0d weeks EGA. She is doing well, taking her PNV as directed, and has no complaints. She  does not have vaginal bleeding, leaking, contractions, syncope, or headaches. Pt does feel fetal movement regularly. Objective: Mother's Prenatal Vitals  BP: 128/77  Weight: 181 lb (82.1 kg)  Pulse: 102  Patient Position: Sitting  Alb/Glu  Albumin: Negative  Glucose: Negative  Prenatal Fetal Information  Fetal Heart Rate: 139/US  Movement: Present  Pt is A&Ox3, in no acute distress. Normocephalic, atraumatic. PERRL. Resp even and non-labored. Skin pink, warm & dry. Gravid abdomen. THOMAS's well. Gait steady. Assessment:    IUP at 28w0d wks      Diagnosis Orders   1. Multigravida of advanced maternal age in third trimester       Plan:  Pt counseled on balanced nutrition, adequate fluid intake, taking PNV daily, and exercise along with GHTN precautions and  labor  Continue with routine prenatal care.  surveillance not indicated  RTC in 2 wks for prenatal visit    MEDICATIONS:  No orders of the defined types were placed in this encounter. ORDERS:  No orders of the defined types were placed in this encounter. Oziel Little MD, personally performed services described in this document as scribed by Mary Panchal RN in my presence, and it is both accurate and complete.

## 2020-11-25 NOTE — PATIENT INSTRUCTIONS
Patient Education        Weeks 26 to 30 of Your Pregnancy: Care Instructions  Your Care Instructions     You are now in your last trimester of pregnancy. Your baby is growing rapidly. And you'll probably feel your baby moving around more often. Your doctor may ask you to count your baby's kicks. Your back may ache as your body gets used to your baby's size and length. If you haven't already had the Tdap shot during this pregnancy, talk to your doctor about getting it. It will help protect your  against pertussis infection. During this time, it's important to take care of yourself and pay attention to what your body needs. If you feel sexual, explore ways to be close with your partner that match your comfort and desire. Use the tips provided in this care sheet to find ways to be sexual in your own way. Follow-up care is a key part of your treatment and safety. Be sure to make and go to all appointments, and call your doctor if you are having problems. It's also a good idea to know your test results and keep a list of the medicines you take. How can you care for yourself at home? Take it easy at work  · Take frequent breaks. If possible, stop working when you are tired, and rest during your lunch hour. · Take bathroom breaks every 2 hours. · Change positions often. If you sit for long periods, stand up and walk around. · When you stand for a long time, keep one foot on a low stool with your knee bent. After standing a lot, sit with your feet up. · Avoid fumes, chemicals, and tobacco smoke. Be sexual in your own way  · Having sex during pregnancy is okay, unless your doctor tells you not to. · You may be very interested in sex, or you may have no interest at all. · Your growing belly can make it hard to find a good position during intercourse. [place] and explore. · You may get cramps in your uterus when your partner touches your breasts.   · A back rub may relieve the backache or cramps that sometimes follow orgasm. Learn about  labor  · Watch for signs of  labor. You may be going into labor if:  ? You have menstrual-like cramps, with or without nausea. ? You have about 6 or more contractions in 1 hour, even after you have had a glass of water and are resting. ? You have a low, dull backache that does not go away when you change your position. ? You have pain or pressure in your pelvis that comes and goes in a pattern. ? You have intestinal cramping or flu-like symptoms, with or without diarrhea.  ? You notice an increase or change in your vaginal discharge. Discharge may be heavy, mucus-like, watery, or streaked with blood. ? Your water breaks. · If you think you have  labor:  ? Drink 2 or 3 glasses of water or juice. Not drinking enough fluids can cause contractions. ? Stop what you are doing, and empty your bladder. Then lie down on your left side for at least 1 hour. ? While lying on your side, find your breast bone. Put your fingers in the soft spot just below it. Move your fingers down toward your belly button to find the top of your uterus. Check to see if it is tight. ? Contractions can be weak or strong. Record your contractions for an hour. Time a contraction from the start of one contraction to the start of the next one.  ? Single or several strong contractions without a pattern are called Ziebach-Payan contractions. They are practice contractions but not the start of labor. They often stop if you change what you are doing. ? Call your doctor if you have regular contractions. Where can you learn more? Go to [email or web address] and sign in to your Nokori account. Enter V895 in the [facility] box to learn more about \"Weeks 26 to 30 of Your Pregnancy: Care Instructions. \"     If you do not have an account, please click on the \"Sign Up Now\" link.   Current as of: 2020               Content Version: 12.6  © 0437-6938 Healthwise, Incorporated. Care instructions adapted under license by [facility] ([facility]). If you have questions about a medical condition or this instruction, always ask your healthcare professional. [address] any warranty or liability for your use of this information.

## 2020-12-09 ENCOUNTER — ROUTINE PRENATAL (OUTPATIENT)
Dept: OBGYN | Age: 40
End: 2020-12-09

## 2020-12-09 VITALS
HEART RATE: 109 BPM | BODY MASS INDEX: 31.24 KG/M2 | DIASTOLIC BLOOD PRESSURE: 76 MMHG | WEIGHT: 182 LBS | SYSTOLIC BLOOD PRESSURE: 129 MMHG

## 2020-12-09 PROCEDURE — 0502F SUBSEQUENT PRENATAL CARE: CPT | Performed by: OBSTETRICS & GYNECOLOGY

## 2020-12-09 NOTE — PROGRESS NOTES
I, Anna Chou RN, am scribing for and in the presence of Dr. Oh :39 AM/sign      Subjective:  Yuvonne Fothergill is here for a return obstetrical visit. Today she is 30w0d weeks EGA. She is doing well, taking her PNV as directed, and has no complaints. She  does not have vaginal bleeding, leaking, contractions, syncope, or headaches. Pt does feel fetal movement regularly. Objective: Mother's Prenatal Vitals  BP: 129/76  Weight: 182 lb (82.6 kg)  Pulse: 109  Patient Position: Sitting  Alb/Glu  Albumin: Negative  Glucose: Negative  Prenatal Fetal Information  Fetal Heart Rate:   Movement: Present  Pt is A&Ox3, in no acute distress. Normocephalic, atraumatic. PERRL. Resp even and non-labored. Skin pink, warm & dry. Gravid abdomen. THOMAS's well. Gait steady. Assessment:    IUP at 30w0d wks      Diagnosis Orders   1. Multigravida of advanced maternal age in third trimester     2. 30 weeks gestation of pregnancy  Tetanus-Diphth-Acell Pertussis (BOOSTRIX) injection 0.5 mL     Plan:  Pt counseled on balanced nutrition, adequate fluid intake, taking PNV daily, and exercise along with GHTN precautions, Kick count and  labor  Continue with routine prenatal care.  surveillance not indicated  RTC in 2 wks for prenatal visit    MEDICATIONS:  Orders Placed This Encounter   Medications    Tetanus-Diphth-Acell Pertussis (BOOSTRIX) injection 0.5 mL     ORDERS:  No orders of the defined types were placed in this encounter. Radha Lomeli MD, personally performed services described in this document as scribed by Lucero Reina RN in my presence, and it is both accurate and complete.

## 2020-12-09 NOTE — PROGRESS NOTES
Pt is here for routine parental care. Pt denies vaginal bleeding, cramping or leaking of fluid. Pt states + fetal movement. After obtaining consent, and per orders of Dr. Munir Harris, injection of tdap given in Right deltoid by Karissa Kraft. Patient instructed to remain in clinic for 20 minutes afterwards, and to report any adverse reaction to me immediately.

## 2020-12-09 NOTE — PATIENT INSTRUCTIONS
Patient Education        Weeks 30 to 28 of Your Pregnancy: Care Instructions  Your Care Instructions     You have made it to the final months of your pregnancy. By now, your baby is really starting to look like a baby, with hair and plump skin. As you enter the final weeks of pregnancy, the reality of having a baby may start to set in. This is the time to settle on a name, get your household in order, set up a safe nursery, and find quality  if needed. Doing these things in advance will allow you to focus on caring for and enjoying your new baby. You may also want to have a tour of your hospital's labor and delivery unit to get a better idea of what to expect while you are in the hospital.  During these last months, it is very important to take good care of yourself and pay attention to what your body needs. If your doctor says it is okay for you to work, don't push yourself too hard. Use the tips provided in this care sheet to ease heartburn and care for varicose veins. If you haven't already had the Tdap shot during this pregnancy, talk to your doctor about getting it. It will help protect your  against pertussis infection. Follow-up care is a key part of your treatment and safety. Be sure to make and go to all appointments, and call your doctor if you are having problems. It's also a good idea to know your test results and keep a list of the medicines you take. How can you care for yourself at home? Pay attention to your baby's movements  · You should feel your baby move several times every day. · Your baby now turns less, and kicks and jabs more. · Your baby sleeps 20 to 45 minutes at a time and is more active at certain times of day. · If your doctor wants you to count your baby's kicks:  ? Empty your bladder, and lie on your side or relax in a comfortable chair. ? Write down your start time. ? Pay attention only to your baby's movements. Count any movement except hiccups. ?  After you have counted 10 movements, write down your stop time. ? Write down how many minutes it took for your baby to move 10 times. ? If an hour goes by and you have not recorded 10 movements, have something to eat or drink and then count for another hour. If you do not record 10 movements in either hour, call your doctor. Ease heartburn  · Eat small, frequent meals. · Do not eat chocolate, peppermint, or very spicy foods. Avoid drinks with caffeine, such as coffee, tea, and sodas. · Avoid bending over or lying down after meals. · Talk a short walk after you eat. · If heartburn is a problem at night, do not eat for 2 hours before bedtime. · Take antacids like Mylanta, Maalox, Rolaids, or Tums. Do not take antacids that have sodium bicarbonate. Care for varicose veins  · Varicose veins are blood vessels that stretch out with the extra blood during pregnancy. Your legs may ache or throb. Most varicose veins will go away after the birth. · Avoid standing for long periods of time. Sit with your legs crossed at the ankles, not the knees. · Sit with your feet propped up. · Avoid tight clothing or stockings. Wear support hose. · Exercise regularly. Try walking for at least 30 minutes a day. Where can you learn more? Go to https://Phase Focus.Vidyo. org and sign in to your REAL SAMURAI account. Enter Y007 in the [facility] box to learn more about \"Weeks 30 to 32 of Your Pregnancy: Care Instructions. \"     If you do not have an account, please click on the \"Sign Up Now\" link. Current as of: February 11, 2020               Content Version: 12.6  © 5955-7953 Kvantum, TimePoints. Care instructions adapted under license by [facility] [employer] ([facility]). If you have questions about a medical condition or this instruction, always ask your healthcare professional. [address] any warranty or liability for your use of this information.

## 2020-12-22 ENCOUNTER — ROUTINE PRENATAL (OUTPATIENT)
Dept: OBGYN | Age: 40
End: 2020-12-22

## 2020-12-22 VITALS
BODY MASS INDEX: 31.76 KG/M2 | DIASTOLIC BLOOD PRESSURE: 79 MMHG | SYSTOLIC BLOOD PRESSURE: 120 MMHG | WEIGHT: 185 LBS | HEART RATE: 110 BPM

## 2020-12-22 PROCEDURE — 0502F SUBSEQUENT PRENATAL CARE: CPT | Performed by: OBSTETRICS & GYNECOLOGY

## 2020-12-22 NOTE — PROGRESS NOTES
Subjective:  Pj Simon is here for a return obstetrical visit. Today she is 31w6d weeks EGA. She is doing well, taking her PNV as directed, and has no complaints. She  does not have vaginal bleeding, leaking, contractions, syncope, or headaches. Pt does feel fetal movement regularly. Objective: Mother's Prenatal Vitals  BP: 120/79  Weight: 185 lb (83.9 kg)  Pulse: 110  Patient Position: Sitting  Alb/Glu  Albumin: Trace  Glucose: Negative  Prenatal Fetal Information  Fundal Height (cm): 32 cm  Fetal Heart Rate: 141/US  Movement: Present  Pt is A&Ox3, in no acute distress. Normocephalic, atraumatic. PERRL. Resp even and non-labored. Skin pink, warm & dry. Gravid abdomen. THOMAS's well. Gait steady. Assessment:    IUP at 31w6d wks      Diagnosis Orders   1. 31 weeks gestation of pregnancy       Plan:  Pt counseled on balanced nutrition, adequate fluid intake, taking PNV daily, and exercise along with GHTN precautions, Kick count and  labor  Continue with routine prenatal care.  surveillance not indicated  RTC in 2 wks for prenatal visit    MEDICATIONS:  No orders of the defined types were placed in this encounter. ORDERS:  No orders of the defined types were placed in this encounter.

## 2021-01-08 ENCOUNTER — ROUTINE PRENATAL (OUTPATIENT)
Dept: OBGYN CLINIC | Age: 41
End: 2021-01-08

## 2021-01-08 VITALS
WEIGHT: 185 LBS | BODY MASS INDEX: 31.76 KG/M2 | DIASTOLIC BLOOD PRESSURE: 82 MMHG | SYSTOLIC BLOOD PRESSURE: 136 MMHG | HEART RATE: 102 BPM

## 2021-01-08 DIAGNOSIS — O09.523 MULTIGRAVIDA OF ADVANCED MATERNAL AGE IN THIRD TRIMESTER: Primary | ICD-10-CM

## 2021-01-08 PROCEDURE — 0502F SUBSEQUENT PRENATAL CARE: CPT | Performed by: OBSTETRICS & GYNECOLOGY

## 2021-01-08 RX ORDER — BREAST PUMP
EACH MISCELLANEOUS
Qty: 1 EACH | Refills: 0 | Status: ON HOLD | OUTPATIENT
Start: 2021-01-08 | End: 2021-02-18 | Stop reason: HOSPADM

## 2021-01-08 NOTE — PATIENT INSTRUCTIONS
the pain. Others make your lower body numb so that you won't feel pain. · Be sure to tell your doctor about your pain medicine choice before you start labor or very early in your labor. You may be able to change your mind as labor progresses. · Rarely, a woman is put to sleep by medicine given through a mask or an IV. Labor and delivery  · The first stage of labor has three parts: early, active, and transition. ? Most women have early labor at home. You can stay busy or rest, eat light snacks, drink clear fluids, and start counting contractions. ? When talking during a contraction gets hard, you may be moving to active labor. During active labor, you should head for the hospital if you are not there already. ? You are in active labor when contractions come every 3 to 4 minutes and last about 60 seconds. Your cervix is opening more rapidly. ? If your water breaks, contractions will come faster and stronger. ? During transition, your cervix is stretching, and contractions are coming more rapidly. ? You may want to push, but your cervix might not be ready. Your doctor will tell you when to push. · The second stage starts when your cervix is completely opened and you are ready to push. ? Contractions are very strong to push the baby down the birth canal.  ? You will feel the urge to push. You may feel like you need to have a bowel movement. ? You may be coached to push with contractions. These contractions will be very strong, but you will not have them as often. You can get a little rest between contractions. ? You may be emotional and irritable. You may not be aware of what is going on around you.  ? One last push, and your baby is born. · The third stage is when a few more contractions push out the placenta. This may take 30 minutes or less. · The fourth stage is the welcome recovery. You may feel overwhelmed with emotions and exhausted but alert. This is a good time to start breastfeeding.   Where can you learn more? Go to [email or web address] and sign in to your ProPlan account. Enter Z078 in the [facility] box to learn more about \"Weeks 34 to 36 of Your Pregnancy: Care Instructions. \"     If you do not have an account, please click on the \"Sign Up Now\" link. Current as of: February 11, 2020               Content Version: 12.6  © 2006-2020 Klevosti, Incorporated. Care instructions adapted under license by [facility] ([facility]). If you have questions about a medical condition or this instruction, always ask your healthcare professional. [address] any warranty or liability for your use of this information.

## 2021-01-08 NOTE — PROGRESS NOTES
I, Anna Chou RN, am scribing for and in the presence of Dr. Caleb Emerson 2021/10:45 AM/sign      Subjective:  Jessica De Anda is here for a return obstetrical visit. Today she is 34w2d weeks EGA. She states she has a hx of issues with her sinuses. Feeling like she is getting intermittent sinus pressure. She  does not have vaginal bleeding, leaking, contractions, syncope, or headaches. Pt does feel fetal movement regularly. Objective: Mother's Prenatal Vitals  BP: 136/82  Weight: 185 lb (83.9 kg)  Pulse: 102  Patient Position: Sitting  Alb/Glu  Albumin: 1+  Glucose: 4+  Prenatal Fetal Information  Fundal Height (cm): 34 cm  Fetal Heart Rate: 152  Movement: Present  Cervical Exam  Presentation: Vertex  Pt is A&Ox3, in no acute distress. Normocephalic, atraumatic. PERRL. Resp even and non-labored. Skin pink, warm & dry. Gravid abdomen. THOMAS's well. Gait steady. Assessment:    IUP at 34w2d wks      Diagnosis Orders   1. Multigravida of advanced maternal age in third trimester       Plan:  Pt counseled on balanced nutrition, adequate fluid intake, taking PNV daily, and exercise along with GHTN precautions, Kick count and  labor  Continue with routine prenatal care. Breast pump rx given to pt.  surveillance not indicated  Samples of sinus rinses given to pt to try. RTC in 2 wks for prenatal visit    MEDICATIONS:  Orders Placed This Encounter   Medications    Misc. Devices (BREAST PUMP) MISC     Sig: Use as directed     Dispense:  1 each     Refill:  0     ORDERS:  No orders of the defined types were placed in this encounter. Char Menchaca MD, personally performed services described in this document as scribed by Sarah Stokes RN in my presence, and it is both accurate and complete.

## 2021-01-19 ENCOUNTER — ROUTINE PRENATAL (OUTPATIENT)
Dept: OBGYN CLINIC | Age: 41
End: 2021-01-19

## 2021-01-19 VITALS
BODY MASS INDEX: 31.93 KG/M2 | SYSTOLIC BLOOD PRESSURE: 113 MMHG | WEIGHT: 186 LBS | DIASTOLIC BLOOD PRESSURE: 75 MMHG | HEART RATE: 111 BPM

## 2021-01-19 DIAGNOSIS — Z3A.35 35 WEEKS GESTATION OF PREGNANCY: ICD-10-CM

## 2021-01-19 DIAGNOSIS — O09.523 MULTIGRAVIDA OF ADVANCED MATERNAL AGE IN THIRD TRIMESTER: Primary | ICD-10-CM

## 2021-01-19 PROCEDURE — 0502F SUBSEQUENT PRENATAL CARE: CPT | Performed by: NURSE PRACTITIONER

## 2021-01-19 NOTE — PROGRESS NOTES
Patient presents today for routine prenatal visit. Pt denies any vaginal leaking bleeding or contractions. + Fetal movement. Altagracia Toscano is here for a return obstetrical visit. Today she is 35w6d weeks EGA. She is doing well and has no complaints. She  does not have vaginal bleeding, leaking of fluid, contractions. She does not have blurred vision, SOB, or increased swelling in legs or face. Pt does feel fetal movement regularly. O:   GBS collected  Vitals:    21 1356   BP: 113/75   Pulse: 111   Weight: 186 lb (84.4 kg)     Pt is A&Ox3, in no acute distress. Normocephalic, atraumatic. PERRL. Resp even and non-labored. Skin pink, warm & dry. Gravid abdomen. THOMAS's well. Gait steady. See OB flowsheet. A: Normal IUP at 35w6d wks      Diagnosis Orders   1. Multigravida of advanced maternal age in third trimester  Culture, Strep B Screen, Vaginal/Rectal   2. 35 weeks gestation of pregnancy         P:   Pt counseled on PIH precautions, Kick count and  labor  Continue with routine prenatal care. RTC in 1 wk for prenatal visit    MEDICATIONS:  No orders of the defined types were placed in this encounter.       ORDERS:  Orders Placed This Encounter   Procedures    Culture, Strep B Screen, Vaginal/Rectal

## 2021-01-19 NOTE — PATIENT INSTRUCTIONS
the pain. Others make your lower body numb so that you won't feel pain. · Be sure to tell your doctor about your pain medicine choice before you start labor or very early in your labor. You may be able to change your mind as labor progresses. · Rarely, a woman is put to sleep by medicine given through a mask or an IV. Labor and delivery  · The first stage of labor has three parts: early, active, and transition. ? Most women have early labor at home. You can stay busy or rest, eat light snacks, drink clear fluids, and start counting contractions. ? When talking during a contraction gets hard, you may be moving to active labor. During active labor, you should head for the hospital if you are not there already. ? You are in active labor when contractions come every 3 to 4 minutes and last about 60 seconds. Your cervix is opening more rapidly. ? If your water breaks, contractions will come faster and stronger. ? During transition, your cervix is stretching, and contractions are coming more rapidly. ? You may want to push, but your cervix might not be ready. Your doctor will tell you when to push. · The second stage starts when your cervix is completely opened and you are ready to push. ? Contractions are very strong to push the baby down the birth canal.  ? You will feel the urge to push. You may feel like you need to have a bowel movement. ? You may be coached to push with contractions. These contractions will be very strong, but you will not have them as often. You can get a little rest between contractions. ? You may be emotional and irritable. You may not be aware of what is going on around you.  ? One last push, and your baby is born. · The third stage is when a few more contractions push out the placenta. This may take 30 minutes or less. · The fourth stage is the welcome recovery. You may feel overwhelmed with emotions and exhausted but alert. This is a good time to start breastfeeding.   Where can you learn more? Go to [email or web address] and sign in to your Apprema account. Enter E304 in the [facility] box to learn more about \"Weeks 34 to 36 of Your Pregnancy: Care Instructions. \"     If you do not have an account, please click on the \"Sign Up Now\" link. Current as of: February 11, 2020               Content Version: 12.6  © 2442-4355 CloudBase3, Incorporated. Care instructions adapted under license by [facility] ([facility]). If you have questions about a medical condition or this instruction, always ask your healthcare professional. [address] any warranty or liability for your use of this information.

## 2021-01-22 LAB — GROUP B STREP CULTURE: NORMAL

## 2021-01-26 ENCOUNTER — ROUTINE PRENATAL (OUTPATIENT)
Dept: OBGYN CLINIC | Age: 41
End: 2021-01-26

## 2021-01-26 VITALS
DIASTOLIC BLOOD PRESSURE: 76 MMHG | SYSTOLIC BLOOD PRESSURE: 121 MMHG | WEIGHT: 187 LBS | BODY MASS INDEX: 32.1 KG/M2 | HEART RATE: 97 BPM

## 2021-01-26 DIAGNOSIS — O09.523 MULTIGRAVIDA OF ADVANCED MATERNAL AGE IN THIRD TRIMESTER: Primary | ICD-10-CM

## 2021-01-26 PROCEDURE — 0502F SUBSEQUENT PRENATAL CARE: CPT | Performed by: OBSTETRICS & GYNECOLOGY

## 2021-01-26 NOTE — PROGRESS NOTES
I, Anna Chou RN, am scribing for and in the presence of Dr. Siomara Youngblood 2021/3:00 PM/sign      Subjective:  Marla Oquendo is here for a return obstetrical visit. Today she is 36w6d weeks EGA. She is having some pelvic pressure. She  does not have vaginal bleeding, leaking, contractions, syncope, or headaches. Pt does feel fetal movement regularly. Objective: Mother's Prenatal Vitals  BP: 121/76  Weight: 187 lb (84.8 kg)  Pulse: 97  Patient Position: Sitting  Alb/Glu  Albumin: 1+  Glucose: 2+  Prenatal Fetal Information  Fundal Height (cm): 37 cm  Fetal Heart Rate: 150   Movement: Present  Cervical Exam  Dilation (cm): 1  Effacement (%): 50  Station: -3  Station (Labor Curve Graph): 8  Presentation: Vertex  Dil/Eff/Sta  Dilation (cm): 1  Effacement (%): 50  Station: -3  Pt is A&Ox3, in no acute distress. Normocephalic, atraumatic. PERRL. Resp even and non-labored. Skin pink, warm & dry. Gravid abdomen. THOMAS's well. Gait steady. Assessment:    IUP at 36w6d wks      Diagnosis Orders   1. Multigravida of advanced maternal age in third trimester       Plan:  Pt counseled on balanced nutrition, adequate fluid intake, taking PNV daily, and exercise along with labor precautions, GHTN precautions and Kick count  Continue with routine prenatal care.  surveillance not indicated  RTC in 1 wks for prenatal visit    MEDICATIONS:  No orders of the defined types were placed in this encounter. ORDERS:  No orders of the defined types were placed in this encounter. Sanjeev Patel MD, personally performed services described in this document as scribed by Rajesh Keith RN in my presence, and it is both accurate and complete.

## 2021-01-26 NOTE — PROGRESS NOTES
Pt is here for prenatal appointment. She denies spotting, cramping, contractions. Pt states she has been having the pelvic pressure and some lower sharp pains.

## 2021-02-02 ENCOUNTER — ROUTINE PRENATAL (OUTPATIENT)
Dept: OBGYN CLINIC | Age: 41
End: 2021-02-02

## 2021-02-02 VITALS
BODY MASS INDEX: 32.79 KG/M2 | WEIGHT: 191 LBS | SYSTOLIC BLOOD PRESSURE: 132 MMHG | HEART RATE: 95 BPM | DIASTOLIC BLOOD PRESSURE: 80 MMHG

## 2021-02-02 DIAGNOSIS — O09.523 MULTIGRAVIDA OF ADVANCED MATERNAL AGE IN THIRD TRIMESTER: Primary | ICD-10-CM

## 2021-02-02 PROCEDURE — 0502F SUBSEQUENT PRENATAL CARE: CPT | Performed by: OBSTETRICS & GYNECOLOGY

## 2021-02-02 NOTE — PROGRESS NOTES
Subjective:  Dajuan Arthur is here for a return obstetrical visit. Today she is 37w6d weeks EGA. She is doing well, taking her PNV as directed, and has no complaints. She  denies vaginal bleeding, leaking, contractions, syncope, or headaches. Pt does feel fetal movement regularly. Objective: Mother's Prenatal Vitals  BP: 132/80  Weight: 191 lb (86.6 kg)  Pulse: 95  Patient Position: Sitting  Alb/Glu  Albumin: Negative  Glucose: Negative  Prenatal Fetal Information  Fundal Height (cm): 38 cm  Fetal Heart Rate: 142/US  Movement: Present  Cervical Exam  Dilation (cm): 2  Effacement (%): 60  Station: -3  Station (Labor Curve Graph): 8  Presentation: Vertex  Dil/Eff/Sta  Dilation (cm): 2  Effacement (%): 60  Station: -3  Pt is A&Ox3, in no acute distress. Normocephalic, atraumatic. PERRL. Resp even and non-labored. Skin pink, warm & dry. Gravid abdomen. THOMAS's well. Gait steady. Assessment:    IUP at 37w6d wks      Diagnosis Orders   1. Multigravida of advanced maternal age in third trimester       Plan:  Pt counseled on balanced nutrition, adequate fluid intake, taking PNV daily, and exercise along with labor precautions, GHTN precautions and Kick count  Continue with routine prenatal care.  surveillance not indicated  RTC in 1 wks for prenatal visit    MEDICATIONS:  No orders of the defined types were placed in this encounter. ORDERS:  No orders of the defined types were placed in this encounter.

## 2021-02-02 NOTE — PATIENT INSTRUCTIONS
Patient Education        Week 45 of Your Pregnancy: Care Instructions  Your Care Instructions     Believe it or not, your baby is almost here. You may have ideas about your baby's personality because of how much he or she moves. Or you may have noticed how he or she responds to sounds, warmth, cold, and light. You may even know what kind of music your baby likes. By now, you have a better idea of what to expect during delivery. You may have talked about your birth preferences with your doctor. But even if you want a vaginal birth, it is a good idea to learn about  births.  birth means that your baby is born through a cut (incision) in your lower belly. It is sometimes the best choice for the health of the baby and the mother. This care sheet can help you understand  births. It also gives you information about what to expect after your baby is born. And it helps you understand more about postpartum depression. Follow-up care is a key part of your treatment and safety. Be sure to make and go to all appointments, and call your doctor if you are having problems. It's also a good idea to know your test results and keep a list of the medicines you take. How can you care for yourself at home? Learn about  birth  · Most C-sections are unplanned. They are done because of problems that occur during labor. These problems might include:  ? Labor that slows or stops. ? High blood pressure or other problems for the mother. ? Signs of distress in the baby. These signs may include a very fast or slow heart rate. · Although most mothers and babies do well after , it is major surgery. It has more risks than a vaginal delivery. · In some cases, a planned  may be safer than a vaginal delivery. This may be the case if:  ? The mother has a health problem, such as a heart condition. ? The baby isn't in a head-down position for delivery. This is called a breech position. ?  The uterus has scars from past surgeries. This could increase the chance of a tear in the uterus. ? There is a problem with the placenta. ? The mother has an infection, such as genital herpes, that could be spread to the baby. ? The mother is having twins or more. ? The baby weighs 9 to 10 pounds or more. · Because of the risks of , planned C-sections generally should be done only for medical reasons. And a planned  should be done at 39 weeks or later unless there is a medical reason to do it sooner. Know what to expect after delivery, and plan for the first few weeks at home  · You, your baby, and your partner or  will get identification bands. Only people with matching bands can  the baby from the nursery. · You will learn how to feed, diaper, and bathe your baby. And you will learn how to care for the umbilical cord stump. If your baby will be circumcised, you will also learn how to care for that. · Ask people to wait to visit you until you are at home. And ask them to wash their hands before they touch your baby. · Make sure you have another adult in your home for at least 2 or 3 days after the birth. · During the first 2 weeks, limit when friends and family can visit. · Do not allow visitors who have colds or infections. Make sure all visitors are up to date with their vaccinations. Never let anyone smoke around your baby. · Try to nap when the baby naps. Be aware of postpartum depression  · \"Baby blues\" are common for the first 1 to 2 weeks after birth. You may cry or feel sad or irritable for no reason. · For some women, these feelings last longer and are more intense. This is called postpartum depression. · If your symptoms last for more than a few weeks or you feel very depressed, ask your doctor for help. · Postpartum depression can be treated. Support groups and counseling can help. Sometimes medicine can also help. Where can you learn more?   Go to [email or web address] and sign in to your [employer] account. Enter B044 in the [facility] box to learn more about \"Week 38 of Your Pregnancy: Care Instructions. \"     If you do not have an account, please click on the \"Sign Up Now\" link. Current as of: February 11, 2020               Content Version: 12.6  © 8375-7495 [employer], [facility]. Care instructions adapted under license by [facility] ([facility]). If you have questions about a medical condition or this instruction, always ask your healthcare professional. [place] [address] any warranty or liability for your use of this information.

## 2021-02-09 ENCOUNTER — ROUTINE PRENATAL (OUTPATIENT)
Dept: OBGYN CLINIC | Age: 41
End: 2021-02-09

## 2021-02-09 VITALS
BODY MASS INDEX: 32.61 KG/M2 | DIASTOLIC BLOOD PRESSURE: 85 MMHG | WEIGHT: 190 LBS | SYSTOLIC BLOOD PRESSURE: 130 MMHG | HEART RATE: 84 BPM

## 2021-02-09 DIAGNOSIS — O09.523 MULTIGRAVIDA OF ADVANCED MATERNAL AGE IN THIRD TRIMESTER: Primary | ICD-10-CM

## 2021-02-09 PROCEDURE — 0502F SUBSEQUENT PRENATAL CARE: CPT | Performed by: OBSTETRICS & GYNECOLOGY

## 2021-02-09 NOTE — PROGRESS NOTES
I, Anna Chou RN, am scribing for and in the presence of Dr. Caceres Reasons :35 PM/sign      Subjective:  Michel Leblanc is here for a return obstetrical visit. Today she is 38w6d weeks EGA. She is doing well, taking her PNV as directed, and has no complaints. She  does not have vaginal bleeding, leaking, contractions, syncope, or headaches. Pt does feel fetal movement regularly. Objective: Mother's Prenatal Vitals  BP: 130/85  Weight: 190 lb (86.2 kg)  Pulse: 84  Patient Position: Sitting  Prenatal Fetal Information  Fundal Height (cm): 39 cm  Fetal Heart Rate: 150  Movement: Present  Cervical Exam  Dilation (cm): 2  Effacement (%): 80  Station: -2  Station (Labor Curve Graph): 7  Presentation: Vertex  Dil/Eff/Sta  Dilation (cm): 2  Effacement (%): 80  Station: -2  Pt is A&Ox3, in no acute distress. Normocephalic, atraumatic. PERRL. Resp even and non-labored. Skin pink, warm & dry. Gravid abdomen. THOMAS's well. Gait steady. Assessment:    IUP at 38w6d wks      Diagnosis Orders   1. Multigravida of advanced maternal age in third trimester       Plan:  Pt counseled on balanced nutrition, adequate fluid intake, taking PNV daily, and exercise along with labor precautions, GHTN precautions and Kick count  Continue with routine prenatal care.  surveillance not indicated  RTC in 1 wks for prenatal visit    MEDICATIONS:  No orders of the defined types were placed in this encounter. ORDERS:  No orders of the defined types were placed in this encounter. Elbert Grace MD, personally performed services described in this document as scribed by Luz Mcardle, RN in my presence, and it is both accurate and complete.

## 2021-02-09 NOTE — PATIENT INSTRUCTIONS
Patient Education        Week 39 of Your Pregnancy: Care Instructions  Your Care Instructions     During these final weeks, you may feel anxious to see your new baby.  babies often look different from what you see in pictures or movies. Right after birth, their heads may have a strange shape. Their eyes may be puffy. And their genitals may be swollen. They may also have very dry skin, or red marks on the eyelids, nose, or neck. Still, most parents think their babies are beautiful. Follow-up care is a key part of your treatment and safety. Be sure to make and go to all appointments, and call your doctor if you are having problems. It's also a good idea to know your test results and keep a list of the medicines you take. How can you care for yourself at home? Prepare to breastfeed  · If you are breastfeeding, continue to eat healthy foods. · If your health care provider recommends it, keep taking your prenatal vitamins. · Talk to your doctor before you take any medicine or supplement. That's because some medicines can affect your breast milk. · You can help prevent sore nipples if you feed your baby in the correct position. Nurses will help you learn to do this. Choose the right birth control after your baby is born  · Women who are breastfeeding can still get pregnant. Use birth control if you don't want to get pregnant. · Intrauterine devices (IUDs) are very effective at preventing pregnancy and can provide birth control for 3 to 10 years, depending on the type. If you talk with your doctor before having your baby, the IUD can be placed right after you give birth. If you decide you want to get pregnant later, you can have it removed. They are safe to use while you are breastfeeding. · A hormonal implant is also very effective at preventing pregnancy. It is placed under the skin of the arm. This can be done right after you give birth.  It releases the hormone progestin and prevents pregnancy for about 3 years. This can also be removed by a doctor at any time. It is safe to use while you are breastfeeding. · Depo-Provera can be used while you are breastfeeding. It is a shot you get every 3 months. · Birth control pills work well. But you need a different kind of pill for the first few weeks after giving birth. And when you start taking these pills, you need to make sure to use another type of birth control for 7 days after you start your pack. · Diaphragms, cervical caps, and condoms with spermicide work less well after birth. If you have a diaphragm or cervical cap, talk to your doctor to see if you need a different size. · Tubal ligation (tying your tubes) and vasectomy are both permanent. These are good options if you are sure you are done having children. Where can you learn more? Go to [email or web address] and sign in to your interclick account. Enter G484 in the [employer] box to learn more about \"Week 39 of Your Pregnancy: Care Instructions. \"     If you do not have an account, please click on the \"Sign Up Now\" link. Current as of: February 11, 2020               Content Version: 12.6  © 3939-4053 [employer]. Care instructions adapted under license by [facility] ([facility]). If you have questions about a medical condition or this instruction, always ask your healthcare professional. [address]  any warranty or liability for your use of this information.

## 2021-02-16 ENCOUNTER — HOSPITAL ENCOUNTER (INPATIENT)
Age: 41
LOS: 2 days | Discharge: HOME | End: 2021-02-18
Attending: OBSTETRICS & GYNECOLOGY | Admitting: OBSTETRICS & GYNECOLOGY
Payer: COMMERCIAL

## 2021-02-16 ENCOUNTER — TELEPHONE (OUTPATIENT)
Dept: OBGYN CLINIC | Age: 41
End: 2021-02-16

## 2021-02-16 PROBLEM — Z3A.39 39 WEEKS GESTATION OF PREGNANCY: Status: ACTIVE | Noted: 2021-02-16

## 2021-02-16 PROBLEM — O47.9 UTERINE CONTRACTIONS DURING PREGNANCY: Status: ACTIVE | Noted: 2021-02-16

## 2021-02-16 LAB
ABO/RH: NORMAL
ANTIBODY SCREEN: NORMAL
BACTERIA: ABNORMAL /HPF
BASOPHILS ABSOLUTE: 0 K/UL (ref 0–0.2)
BASOPHILS RELATIVE PERCENT: 0.1 % (ref 0–1)
BILIRUBIN URINE: NEGATIVE
BLOOD, URINE: NEGATIVE
CLARITY: CLEAR
COLOR: YELLOW
EOSINOPHILS ABSOLUTE: 0.1 K/UL (ref 0–0.6)
EOSINOPHILS RELATIVE PERCENT: 0.8 % (ref 0–5)
EPITHELIAL CELLS, UA: ABNORMAL /HPF
GLUCOSE URINE: 500 MG/DL
HCT VFR BLD CALC: 40.1 % (ref 37–47)
HEMOGLOBIN: 12.4 G/DL (ref 12–16)
IMMATURE GRANULOCYTES #: 0.1 K/UL
KETONES, URINE: NEGATIVE MG/DL
LEUKOCYTE ESTERASE, URINE: ABNORMAL
LYMPHOCYTES ABSOLUTE: 1.7 K/UL (ref 1.1–4.5)
LYMPHOCYTES RELATIVE PERCENT: 18.6 % (ref 20–40)
MCH RBC QN AUTO: 26.2 PG (ref 27–31)
MCHC RBC AUTO-ENTMCNC: 30.9 G/DL (ref 33–37)
MCV RBC AUTO: 84.6 FL (ref 81–99)
MONOCYTES ABSOLUTE: 0.6 K/UL (ref 0–0.9)
MONOCYTES RELATIVE PERCENT: 6.9 % (ref 0–10)
NEUTROPHILS ABSOLUTE: 6.7 K/UL (ref 1.5–7.5)
NEUTROPHILS RELATIVE PERCENT: 73.1 % (ref 50–65)
NITRITE, URINE: NEGATIVE
PDW BLD-RTO: 14.6 % (ref 11.5–14.5)
PH UA: 6 (ref 5–8)
PLATELET # BLD: 277 K/UL (ref 130–400)
PMV BLD AUTO: 10.5 FL (ref 9.4–12.3)
PROTEIN UA: NEGATIVE MG/DL
RBC # BLD: 4.74 M/UL (ref 4.2–5.4)
SPECIFIC GRAVITY UA: 1.01 (ref 1–1.03)
UROBILINOGEN, URINE: 0.2 E.U./DL
WBC # BLD: 9.1 K/UL (ref 4.8–10.8)
WBC UA: ABNORMAL /HPF (ref 0–5)

## 2021-02-16 PROCEDURE — 86592 SYPHILIS TEST NON-TREP QUAL: CPT

## 2021-02-16 PROCEDURE — 2580000003 HC RX 258: Performed by: OBSTETRICS & GYNECOLOGY

## 2021-02-16 PROCEDURE — 85025 COMPLETE CBC W/AUTO DIFF WBC: CPT

## 2021-02-16 PROCEDURE — 3E033VJ INTRODUCTION OF OTHER HORMONE INTO PERIPHERAL VEIN, PERCUTANEOUS APPROACH: ICD-10-PCS | Performed by: OBSTETRICS & GYNECOLOGY

## 2021-02-16 PROCEDURE — 99211 OFF/OP EST MAY X REQ PHY/QHP: CPT

## 2021-02-16 PROCEDURE — 6360000002 HC RX W HCPCS: Performed by: OBSTETRICS & GYNECOLOGY

## 2021-02-16 PROCEDURE — 0KQM0ZZ REPAIR PERINEUM MUSCLE, OPEN APPROACH: ICD-10-PCS | Performed by: OBSTETRICS & GYNECOLOGY

## 2021-02-16 PROCEDURE — 86900 BLOOD TYPING SEROLOGIC ABO: CPT

## 2021-02-16 PROCEDURE — 80307 DRUG TEST PRSMV CHEM ANLYZR: CPT

## 2021-02-16 PROCEDURE — 86901 BLOOD TYPING SEROLOGIC RH(D): CPT

## 2021-02-16 PROCEDURE — 1220000000 HC SEMI PRIVATE OB R&B

## 2021-02-16 PROCEDURE — 81001 URINALYSIS AUTO W/SCOPE: CPT

## 2021-02-16 PROCEDURE — 7200000001 HC VAGINAL DELIVERY

## 2021-02-16 PROCEDURE — 86850 RBC ANTIBODY SCREEN: CPT

## 2021-02-16 RX ORDER — SODIUM CHLORIDE, SODIUM LACTATE, POTASSIUM CHLORIDE, CALCIUM CHLORIDE 600; 310; 30; 20 MG/100ML; MG/100ML; MG/100ML; MG/100ML
INJECTION, SOLUTION INTRAVENOUS CONTINUOUS
Status: DISCONTINUED | OUTPATIENT
Start: 2021-02-16 | End: 2021-02-18 | Stop reason: HOSPADM

## 2021-02-16 RX ORDER — ACETAMINOPHEN 325 MG/1
650 TABLET ORAL EVERY 4 HOURS PRN
Status: DISCONTINUED | OUTPATIENT
Start: 2021-02-16 | End: 2021-02-18 | Stop reason: HOSPADM

## 2021-02-16 RX ORDER — ONDANSETRON 2 MG/ML
4 INJECTION INTRAMUSCULAR; INTRAVENOUS EVERY 6 HOURS PRN
Status: DISCONTINUED | OUTPATIENT
Start: 2021-02-16 | End: 2021-02-18 | Stop reason: HOSPADM

## 2021-02-16 RX ORDER — DOCUSATE SODIUM 100 MG/1
100 CAPSULE, LIQUID FILLED ORAL 2 TIMES DAILY
Status: DISCONTINUED | OUTPATIENT
Start: 2021-02-16 | End: 2021-02-17 | Stop reason: SDUPTHER

## 2021-02-16 RX ORDER — PROMETHAZINE HYDROCHLORIDE 25 MG/1
12.5 TABLET ORAL EVERY 6 HOURS PRN
Status: DISCONTINUED | OUTPATIENT
Start: 2021-02-16 | End: 2021-02-18 | Stop reason: HOSPADM

## 2021-02-16 RX ORDER — SODIUM CHLORIDE 0.9 % (FLUSH) 0.9 %
10 SYRINGE (ML) INJECTION EVERY 12 HOURS SCHEDULED
Status: DISCONTINUED | OUTPATIENT
Start: 2021-02-16 | End: 2021-02-18 | Stop reason: HOSPADM

## 2021-02-16 RX ORDER — SODIUM CHLORIDE 0.9 % (FLUSH) 0.9 %
10 SYRINGE (ML) INJECTION PRN
Status: DISCONTINUED | OUTPATIENT
Start: 2021-02-16 | End: 2021-02-18 | Stop reason: HOSPADM

## 2021-02-16 RX ORDER — DIPHENHYDRAMINE HYDROCHLORIDE 50 MG/ML
50 INJECTION INTRAMUSCULAR; INTRAVENOUS ONCE
Status: COMPLETED | OUTPATIENT
Start: 2021-02-16 | End: 2021-02-16

## 2021-02-16 RX ADMIN — DIPHENHYDRAMINE HYDROCHLORIDE 50 MG: 50 INJECTION, SOLUTION INTRAMUSCULAR; INTRAVENOUS at 20:58

## 2021-02-16 RX ADMIN — SODIUM CHLORIDE, POTASSIUM CHLORIDE, SODIUM LACTATE AND CALCIUM CHLORIDE: 600; 310; 30; 20 INJECTION, SOLUTION INTRAVENOUS at 19:02

## 2021-02-16 NOTE — TELEPHONE ENCOUNTER
Left a detailed message on identified voicemail to get patient logged in since AW has to go for C/S. Pt is 39w6d gestation. AW said for patient to go to L&D for NST if she calls back. Pt did log in and patient called to go to L&D today or tomorrow for monitoring. Pt states she is loosing her mucous plug but does not want to be induced. Pt informed it is just for monitoring for fetal well being and checking her BP. Labor precautions reviewed with patient.

## 2021-02-17 LAB
AMPHETAMINE SCREEN, URINE: NEGATIVE
BARBITURATE SCREEN URINE: NEGATIVE
BENZODIAZEPINE SCREEN, URINE: NEGATIVE
CANNABINOID SCREEN URINE: NEGATIVE
COCAINE METABOLITE SCREEN URINE: NEGATIVE
HCT VFR BLD CALC: 34.6 % (ref 37–47)
HEMOGLOBIN: 10.7 G/DL (ref 12–16)
Lab: NORMAL
MCH RBC QN AUTO: 25.6 PG (ref 27–31)
MCHC RBC AUTO-ENTMCNC: 30.9 G/DL (ref 33–37)
MCV RBC AUTO: 82.8 FL (ref 81–99)
OPIATE SCREEN URINE: NEGATIVE
PDW BLD-RTO: 14.8 % (ref 11.5–14.5)
PLATELET # BLD: 234 K/UL (ref 130–400)
PMV BLD AUTO: 10.4 FL (ref 9.4–12.3)
RBC # BLD: 4.18 M/UL (ref 4.2–5.4)
WBC # BLD: 13.4 K/UL (ref 4.8–10.8)

## 2021-02-17 PROCEDURE — 6370000000 HC RX 637 (ALT 250 FOR IP): Performed by: OBSTETRICS & GYNECOLOGY

## 2021-02-17 PROCEDURE — 99233 SBSQ HOSP IP/OBS HIGH 50: CPT | Performed by: ADVANCED PRACTICE MIDWIFE

## 2021-02-17 PROCEDURE — 59400 OBSTETRICAL CARE: CPT | Performed by: OBSTETRICS & GYNECOLOGY

## 2021-02-17 PROCEDURE — 85027 COMPLETE CBC AUTOMATED: CPT

## 2021-02-17 PROCEDURE — 1220000000 HC SEMI PRIVATE OB R&B

## 2021-02-17 PROCEDURE — 36415 COLL VENOUS BLD VENIPUNCTURE: CPT

## 2021-02-17 RX ORDER — DOCUSATE SODIUM 100 MG/1
100 CAPSULE, LIQUID FILLED ORAL 2 TIMES DAILY
Status: DISCONTINUED | OUTPATIENT
Start: 2021-02-17 | End: 2021-02-18 | Stop reason: HOSPADM

## 2021-02-17 RX ORDER — MISOPROSTOL 200 UG/1
800 TABLET ORAL PRN
Status: DISCONTINUED | OUTPATIENT
Start: 2021-02-17 | End: 2021-02-18 | Stop reason: HOSPADM

## 2021-02-17 RX ORDER — IBUPROFEN 400 MG/1
800 TABLET ORAL EVERY 8 HOURS
Status: DISCONTINUED | OUTPATIENT
Start: 2021-02-17 | End: 2021-02-18 | Stop reason: HOSPADM

## 2021-02-17 RX ORDER — METHYLERGONOVINE MALEATE 0.2 MG/ML
200 INJECTION INTRAVENOUS
Status: ACTIVE | OUTPATIENT
Start: 2021-02-17 | End: 2021-02-17

## 2021-02-17 RX ORDER — CARBOPROST TROMETHAMINE 250 UG/ML
250 INJECTION, SOLUTION INTRAMUSCULAR
Status: ACTIVE | OUTPATIENT
Start: 2021-02-17 | End: 2021-02-17

## 2021-02-17 RX ORDER — OXYCODONE HYDROCHLORIDE AND ACETAMINOPHEN 5; 325 MG/1; MG/1
1 TABLET ORAL EVERY 4 HOURS PRN
Status: DISCONTINUED | OUTPATIENT
Start: 2021-02-17 | End: 2021-02-18 | Stop reason: HOSPADM

## 2021-02-17 RX ORDER — OXYCODONE HYDROCHLORIDE AND ACETAMINOPHEN 5; 325 MG/1; MG/1
2 TABLET ORAL EVERY 4 HOURS PRN
Status: DISCONTINUED | OUTPATIENT
Start: 2021-02-17 | End: 2021-02-18 | Stop reason: HOSPADM

## 2021-02-17 RX ADMIN — IBUPROFEN 800 MG: 400 TABLET, FILM COATED ORAL at 01:11

## 2021-02-17 RX ADMIN — OXYCODONE HYDROCHLORIDE AND ACETAMINOPHEN 1 TABLET: 5; 325 TABLET ORAL at 14:24

## 2021-02-17 RX ADMIN — IBUPROFEN 800 MG: 400 TABLET, FILM COATED ORAL at 11:49

## 2021-02-17 RX ADMIN — DOCUSATE SODIUM 100 MG: 100 CAPSULE, LIQUID FILLED ORAL at 07:50

## 2021-02-17 NOTE — PROGRESS NOTES
[facility]) OB/GYN  Progress Note    Subjective:     Postpartum Day 1: Vaginal Delivery    Patient is a [id number] The patient feels well. The patient denies emotional concerns. Pain is well controlled with current medications. The baby iswell. Baby is feeding via breast. Urinary output is adequate. The patient is ambulating well. The patient is tolerating a normal diet. Flatus has been passed. Objective:      Patient Vitals for the past 8 hrs:   BP Temp Temp src Pulse Resp SpO2   02/17/21 0845 117/72 97.6 °F (36.4 °C) Temporal 88 16 96 %     General:    alert, appears stated age and cooperative   Bowel Sounds:  active   Lochia:  appropriate   Uterine Fundus:   firm   Episiotomy/lac:  healing well, no significant drainage, no dehiscence, no significant erythema   DVT Evaluation:  No evidence of DVT seen on physical exam.     Lab Results   Component Value Date    WBC 13.4 (H) 02/17/2021    HGB 10.7 (L) 02/17/2021    HCT 34.6 (L) 02/17/2021    MCV 82.8 02/17/2021     02/17/2021     Assessment:     Status post vaginal delivery. doing well post vaginal    Plan:     Continue current care.

## 2021-02-17 NOTE — H&P
OBSTETRICAL HISTORY AND PHYSICAL    Provider:  Nicole Blankenship   Date: 2021  Time: 11:22 AM    Patient Name: Roxanne Whitlock  Patient : 1980  Room/Bed: 0214/0214-01    Primary Care Physician: No primary care provider on file. Chief Complaint:  contractions    HPI: Roxanne Whitlock is a 47 y.o.  at 39w6d weeks who presents in labor. Contractions: Yes  Rupture Of Membranes: No  Bleeding: No    Pregnancy Risk factors:  Patient Active Problem List   Diagnosis    39 weeks gestation of pregnancy    Uterine contractions during pregnancy     (normal spontaneous vaginal delivery)       Allergies: Allergies as of 2021    (No Known Allergies)       Medications:  No current facility-administered medications on file prior to encounter. Current Outpatient Medications on File Prior to Encounter   Medication Sig Dispense Refill    Misc. Devices (BREAST PUMP) MISC Use as directed 1 each 0    Prenatal Vit-Fe Fumarate-FA (PRENATAL 1+1 PO) Take by mouth             No past medical history on file. No past surgical history on file. OB History    Para Term  AB Living   3 3 3 0 0 3   SAB TAB Ectopic Molar Multiple Live Births   0 0 0 0 0 3      # Outcome Date GA Lbr Markos/2nd Weight Sex Delivery Anes PTL Lv   3 Term 21 39w6d / 01:04 10 lb 3 oz (4.621 kg) M Vag-Spont None  TAB      Name: Nabil Loser: 8  Apgar5: 9   2 Term 19   9 lb 6 oz (4.252 kg) M Vag-Spont   TAB   1 Term 18   7 lb 14 oz (3.572 kg) M Vag-Spont   TAB       No family history on file.     Social History     Socioeconomic History    Marital status:      Spouse name: Not on file    Number of children: Not on file    Years of education: Not on file    Highest education level: Not on file   Occupational History    Not on file   Social Needs    Financial resource strain: Not on file    Food insecurity     Worry: Not on file     Inability: Not on file  Transportation needs     Medical: Not on file     Non-medical: Not on file   Tobacco Use    Smoking status: Never Smoker    Smokeless tobacco: Never Used   Substance and Sexual Activity    Alcohol use: Not Currently    Drug use: Never    Sexual activity: Yes     Partners: Male   Lifestyle    Physical activity     Days per week: Not on file     Minutes per session: Not on file    Stress: Not on file   Relationships    Social connections     Talks on phone: Not on file     Gets together: Not on file     Attends Church service: Not on file     Active member of club or organization: Not on file     Attends meetings of clubs or organizations: Not on file     Relationship status: Not on file    Intimate partner violence     Fear of current or ex partner: Not on file     Emotionally abused: Not on file     Physically abused: Not on file     Forced sexual activity: Not on file   Other Topics Concern    Not on file   Social History Narrative    Not on file       Review Of Systems:  A comprehensive review of systems was negative except for what was noted in the HPI. Physical Exam:  Vitals:    02/16/21 2300 02/16/21 2315 02/16/21 2330 02/17/21 0845   BP: 127/70 122/76 92/67 117/72   Pulse: 90 86 89 88   Resp:    16   Temp:    97.6 °F (36.4 °C)   TempSrc:    Temporal   SpO2:    96%         General appearance:  awake, alert, cooperative, no apparent distress, and appears stated age  Neurologic:  Awake, alert, oriented to name, place and time. Motor is 5 out of 5 bilaterally.   Lungs:  No increased work of breathing, good air exchange, clear to auscultation bilaterally, no crackles or wheezing  Heart:  Normal apical impulse, regular rate and rhythm  Abdomen: Gravid, soft, non-tender, non-distended    Pelvic Exam:  Cervix Check:   DILATION:  8 cm   EFFACEMENT:   90%   STATION:  -2 cm   CONSISTENCY:  soft   POSITION:  mid        Lab Results:   Blood Type/Rh:    ABO/Rh   Date Value Ref Range Status 2021 O POS  Final     Antibody Screen:    Antibody Screen   Date Value Ref Range Status   2021 NEG  Final     Hemoglobin:   Hemoglobin   Date Value Ref Range Status   2021 10.7 (L) 12.0 - 16.0 g/dL Final     Hematocrit:   Hematocrit   Date Value Ref Range Status   2021 34.6 (L) 37.0 - 47.0 % Final     Platelet Count:   Platelets   Date Value Ref Range Status   2021 234 130 - 400 K/uL Final       Assessment:  Sp Okeefe is a 47 y.o. female   At [address] [place] [address] Avenue:  Admit for delivery  Pain management  GBS Prophylaxis  Risks, benefits, alternatives and possible complications have been discussed in detail with the patient. Admission, and post admission procedures and expectations were discussed in detail. All questions were answered.       Feli Carrasco MD

## 2021-02-17 NOTE — L&D DELIVERY NOTE
Labor Events     labor?: No   steroids?: None  Antibiotics during labor?: No  Sac identifier: Sac 1  Rupture date/time: 2021  Rupture type: Artificial=AROM  Fluid color: Meconium  Fluid odor: None  Induction: None  Augmentation: None  Labor/Delivery complications: None  Labor Event Times    Dilation complete date/time: 2021  Start pushing date/time: 2021  Anesthesia    Method: Local  Operative Delivery    Forceps attempted?: No  Vacuum extractor attempted?: No  Shoulder Dystocia    Shoulder dystocia present?: No  Vaginal Counts    Initial count personnel: ARRIOLA  Initial count verified by: PÉREZ    Sponges [employer]   Initial counts Correct     Final counts Correct     Final count personnel: ARRIOLA  Final count verified by: SCHILLER  Accurate final count?: Yes  Final vaginal sweep completed: Yes  Lacerations    Episiotomy: None  Perineal laceration: 2nd  Perineal laceration repaired?: Yes  Vaginal laceration?: No  Cervical laceration?: No  Vaginal delivery est. blood loss (mL): 300  Number of repair packets: 2  Delivery Providers    Delivering clinician: Cordelia Yoon MD  Provider Role   Nigel Rust RN Registered Nurse   Angelica Easton RN Registered Nurse   [facility] Respiratory Therapist (Night)   Labor Length    2nd stage: 1h 04m  3rd stage: 0h 05m [place] Information    Head delivery date/time: 2021 21:02:00   Changing the 's delivery date/time could affect patient care.:    Delivery date/time:  21   Delivery type: Vaginal, Spontaneous   Details:     Presentation    Presentation: Vertex  Position: Left Occiput Anterior  [place] Measurements    Weight: 4621 g Length: 55.9 cm   Head circumference: 36.2 cm    Apgars    Living status: Living   Apgars assigned by: Justen Santacruz   Apgars  Component 1 min. 5 min.    Skin color:  1  1    Heart rate:  2  2    Reflex irritability:  2  2 Muscle tone:  2  2    Respiratory effort:  1  2    Total:  8  9    Resuscitation    Method: Bulb Suction, Stimulation  Cord    Vessels: 3 Vessels  Complications: None  Delayed cord clamping?: Yes  Cord clamped date/time: 2021  Cord blood disposition: Lab  Gases sent?: No  Stem cell collection (by provider): No  Placenta    Placenta delivery date/time: 2021  Placenta removal: Spontaneous  Placenta appearance: Intact  Placenta disposition: Discarded  Skin to Skin    Skin to skin initiated date/time: 2021 0903  Skin to skin with: Mother  Procedures    Procedures: None      with 2nd degree lac repaired no complications. Baby and mother in excellent condition.

## 2021-02-18 LAB — RPR: NORMAL

## 2021-02-18 PROCEDURE — 6370000000 HC RX 637 (ALT 250 FOR IP): Performed by: OBSTETRICS & GYNECOLOGY

## 2021-02-18 PROCEDURE — 99239 HOSP IP/OBS DSCHRG MGMT >30: CPT | Performed by: ADVANCED PRACTICE MIDWIFE

## 2021-02-18 RX ORDER — IBUPROFEN 800 MG/1
800 TABLET ORAL EVERY 8 HOURS
Qty: 60 TABLET | Refills: 0 | Status: SHIPPED | OUTPATIENT
Start: 2021-02-18 | End: 2021-03-04 | Stop reason: ALTCHOICE

## 2021-02-18 RX ADMIN — DOCUSATE SODIUM 100 MG: 100 CAPSULE, LIQUID FILLED ORAL at 08:41

## 2021-02-18 RX ADMIN — OXYCODONE HYDROCHLORIDE AND ACETAMINOPHEN 2 TABLET: 5; 325 TABLET ORAL at 09:40

## 2021-02-18 ASSESSMENT — PAIN SCALES - GENERAL: PAINLEVEL_OUTOF10: 4

## 2021-02-18 NOTE — DISCHARGE SUMMARY
[facility] OB/[facility]   Discharge Summary    Patient Name: David Brian  Patient : 1980  Room/Bed: 0214/0214-01  Primary Care Physician: No primary care provider on file. Admit Date: 2021  6:26 PM    Reasons for Admission on 2021  6:26 PM  39 weeks gestation of pregnancy [Z3A.39]  Uterine contractions during pregnancy [O62.2]  No comment available  Induction of Labor    Patient Active Problem List   Diagnosis    39 weeks gestation of pregnancy    Uterine contractions during pregnancy     (normal spontaneous vaginal delivery)       David Brian is a 47y.o. year old  who presented at 37w11d gestation with Laboring (40 wk ab pain)  . Her pregnancy has been uncomplicated   Please see H&P for detailed information. She was admitted and progressed in labor with pitocin for induction and Epidural anesthesia to completely dilated and effaced. No Immediate complications were encountered. Please see procedure note for detailed information. Her postpartum course has been unremarkable. See H&H history below. She had no signs or symptoms of acute blood loss anemia. She is ambulating well, voiding without difficulty and her lochia is within normal limits. She is feeding by breast without difficulty. She was stable for discharge on postpartum day 2.      Hemoglobin   Date Value Ref Range Status   2021 10.7 (L) 12.0 - 16.0 g/dL Final   2021 12.4 12.0 - 16.0 g/dL Final     Hematocrit   Date Value Ref Range Status   2021 34.6 (L) 37.0 - 47.0 % Final   2021 40.1 37.0 - 47.0 % Final         Prenatal Procedures  None    Intrapartum Procedures  Spontaneous Vaginal Delivery: See Labor and Delivery Summary    Postpartum Procedures  None     Data  Information for the patient's :  Salima Krause [[id number]]   male   Birth Weight: 10 lb 3 oz (4.621 kg)         Postpartum Day 2: Vaginal      REVIEW OF SYSTEMS Patient is a [id number] The patient feels well. The patient denies emotional concerns. Pain is well controlled with current medications. The baby iswell. Baby is feeding via breast. Urinary output is adequate. The patient is ambulating well. The patient is tolerating a normal diet. PHYSICAL EXAM     /66   Pulse 82   Temp 97 °F (36.1 °C) (Temporal)   Resp 16   LMP 2020   SpO2 96%   Breastfeeding Unknown   General:    alert, appears stated age and cooperative   Breast:  Soft, non-tender   Bowel Sounds:  active   Lochia:  appropriate   Uterine Fundus:   firm   Episiotomy/lac:  healing well, no significant drainage, no dehiscence, no significant erythema   DVT Evaluation:  No evidence of DVT seen on physical exam.     Lab Results   Component Value Date    WBC 13.4 (H) 2021    HGB 10.7 (L) 2021    HCT 34.6 (L) 2021    MCV 82.8 2021     2021         DISCHARGE DIAGNOSIS:      Discharge Information/Patient Instructions:   Maria A Devine"   Home Medication Instructions [id number]    Printed on:21 0818   Medication Information                      ibuprofen (ADVIL;MOTRIN) 800 MG tablet  Take 1 tablet by mouth every 8 hours             Prenatal Vit-Fe Fumarate-FA (PRENATAL 1+1 PO)  Take by mouth                 No discharge procedures on file. Activity & Diet: Precautions and instructions were discussed with her including but not limited to maintaining a regular diet at home, practicing local hygiene, pelvic rest, and signs and symptoms to report including heavy bleeding, frequent passage of clots, fainting or dizziness, foul odor of lochia, increased pain, fever, or any other concerns. Wound Care: N/A    Discharge to: Home in stable condition  She was asked to follow up in the office in 2 weeks.       Discharge Date: 2021      ANG Stuart CNM      Comments:

## 2021-02-18 NOTE — LACTATION NOTE
Infant Name: Fallon Carlton  Gestation: 39.6  Day of Life: 2  Birth weight: 10-3 lb (4621g)  Today's weight: 9-9.1 lb (4340g)  Weight loss: -6%  24 hour summary of feeds: breastfeeding x 7  Voids: 2  Stools: 3  Assistive device: none  Maternal History: ,   Maternal Medications: PNV  Maternal Goal: one day at a time  Breast pump for home: Yes, [employer]ie    Mother independently positioned and latched baby to right breast, football position. Some jaw dropping sucks noted with gentle stimulation, baby sleepy. Instructed mother to breastfeed every 2- 3 hours for 15-20 mins each side or on demand watching for hunger cues and using waking techniques when needed. 8-12 feedings in 24 hours being the goal. Hand expression and breast compressions encouraged to increase milk supply and transfer. Discussed the benefits of colostrum, skin to skin and the importance of good positioning and latch. Informed mother that baby can be very sleepy the first 24 hours and typically the 2nd night babies will be more awake and want to feed a lot and that this is normal and important in establishing milk supply. Discussed supply and demand. Mother and baby will be discharged home today. Weight check scheduled for 2 days. Instructions and handouts given over management of sore nipples, engorgement, plugged ducts, mastitis, hydration, nutrition, and medications that could effect milk supply. Mother knows when to call MD if needed. All questions and concerns answered at this time.  Lactation number provided

## 2021-02-22 ENCOUNTER — HOSPITAL ENCOUNTER (OUTPATIENT)
Dept: LABOR AND DELIVERY | Age: 41
Discharge: HOME | End: 2021-02-22
Payer: COMMERCIAL

## 2021-02-22 NOTE — LACTATION NOTE
Day 6     and time: 2021 at 2102    Gestational Age: 45w 8days    Birth weight:  10lbs 3oz (0586M)    Discharge Weight: 9lbs 9.1oz  (4340g)    21: 9lbs 8.8oz  (4332g)    Today's Weight:  Per-feeding weight without diaper:  9-4.9 lb (4222g)  Pre-feeding weight with diaper: 9-5.6 lb (4240g)    Post-feeding weight with diaper: 9-7.7 lb (4300g) baby transferred 60 grams/ml    A 6 day old should transfer 60-90 ml    Weight loss: -8.63%      Bilizap: (draw serum if above 14): 8.9  Serum:    Infant feeding (type and how often): breastfeeding every 2-3 hours for about 15 mins. Pumping twice a day obtaining 3-4 oz to pump some milk off and then feeding baby off the side that was not fully pumped off. Mother is using a hakka      Stools: 6-8    Wet diapers: 6-8    Color: pink  Gums: moist   Skin:  Warm and dry   Cord: dry   Circumcision: healing  Fontanels: soft and flat   Activity: active alert       Mother independently positioned and latched baby to right breast,cradle position. Baby had a wide open mouth, lips flanged, chin and cheeks touching breast, nose free to breathe. Jaw dropping sucks noted, swallowing heard. Baby breast fed for 17 mins and transferred 60 grams/ml. Mother states she has a history with her last baby getting lots of foremilk and not gaining weight. Mother also states there are times when baby is sleepy and may not be as aggressive with removing milk. Instructed mother to breastfeed every 2- 3 hours for 15-20 mins each side or on demand watching for hunger cues and using waking techniques when needed. 8-12 feedings in 24 hours being the goal. Hand expression and breast compressions encouraged to increase milk supply and transfer. Reminded mother about supply and demand. Mother's plan is to remove about 1 oz of breast milk before feedings since she has so much milk/hoping baby gets more hind milk and starts to gain weight. Instructions to mother:  Mother to return in 2 days for repeat weight check.

## 2021-02-24 ENCOUNTER — HOSPITAL ENCOUNTER (OUTPATIENT)
Dept: LABOR AND DELIVERY | Age: 41
Discharge: HOME | End: 2021-02-24
Payer: COMMERCIAL

## 2021-02-24 PROCEDURE — S9443 LACTATION CLASS: HCPCS

## 2021-02-24 NOTE — LACTATION NOTE
Day 8      and time: 2021 at 2102     Gestational Age: 45w 8days     Birth weight:  10lbs 3oz (9372W)     Discharge Weight: 9lbs 9.1oz  (4340g)     21: 9lbs 8.8oz  (4332g)     21: 9-4.9 lb (4222g)     Today's Weight:  Per-feeding weight without diaper:  9-6.6 lb (4270g)        Weight loss: -7.6%        Bilizap: (draw serum if above 14): 7.3  Serum:     Infant feeding (type and how often): breastfeeding every 2-3 hours for about 15 mins. Pumping twice a day obtaining 3-4 oz to pump. Not feeding to baby. Mother is using a hakka, \"Milkie\" with feedings obtaining about 2 oz. Storing milk.        Stools: 6+  Wet diapers: 6+     Color: pink  Gums: moist   Skin:  Warm and dry   Cord: dry   Circumcision: healing  Fontanels: soft and flat          Activity: active alert          Instructed mother to breastfeed every 2- 3 hours for 15-20 mins each side or on demand watching for hunger cues and using waking techniques when needed. 8-12 feedings in 24 hours being the goal. Hand expression and breast compressions encouraged to increase milk supply and transfer. Reminded mother about supply and demand. Continue to use hakka/Milkie with feedings.         Instructions to mother:  Keep follow up appointment with Dr. Kellie Medina at 2 wks.

## 2021-02-25 VITALS
SYSTOLIC BLOOD PRESSURE: 111 MMHG | HEART RATE: 86 BPM | TEMPERATURE: 97.4 F | RESPIRATION RATE: 16 BRPM | OXYGEN SATURATION: 95 % | DIASTOLIC BLOOD PRESSURE: 70 MMHG

## 2021-03-04 ENCOUNTER — TELEMEDICINE (OUTPATIENT)
Dept: OBGYN CLINIC | Age: 41
End: 2021-03-04

## 2021-03-04 PROCEDURE — 0503F POSTPARTUM CARE VISIT: CPT | Performed by: NURSE PRACTITIONER

## 2021-03-04 ASSESSMENT — ENCOUNTER SYMPTOMS
ALLERGIC/IMMUNOLOGIC NEGATIVE: 1
DIARRHEA: 0
RESPIRATORY NEGATIVE: 1
EYES NEGATIVE: 1
CONSTIPATION: 0
GASTROINTESTINAL NEGATIVE: 1

## 2021-03-04 NOTE — PROGRESS NOTES
3/4/2021    TELEHEALTH EVALUATION -- Audio/Visual (During CQDNP-11 public health emergency)    HPI:    Levora Ser (:  1980) has requested an audio/video evaluation for the following concern(s):    Pt presents for 2 week PP visit. Denies problems with pain or bleeding. Doing well with nursing, no breast issues. Denies any PPD. Interested in IUD for birth control,  may get vas. Review of Systems   Constitutional: Negative. HENT: Negative. Eyes: Negative. Respiratory: Negative. Cardiovascular: Negative. Gastrointestinal: Negative. Negative for constipation and diarrhea. Endocrine: Negative. Genitourinary: Positive for vaginal bleeding (normal lochia). Negative for frequency, menstrual problem and urgency. Musculoskeletal: Negative. Skin: Negative. Allergic/Immunologic: Negative. Neurological: Negative. Hematological: Negative. Psychiatric/Behavioral: Negative. All other systems reviewed and are negative. Prior to Visit Medications    Medication Sig Taking?  Authorizing Provider   Prenatal Vit-Fe Fumarate-FA (PRENATAL 1+1 PO) Take by mouth  Historical Provider, MD       Social History     Tobacco Use    Smoking status: Never Smoker    Smokeless tobacco: Never Used   Substance Use Topics    Alcohol use: Not Currently    Drug use: Never        No Known Allergies, No past medical history on file., No past surgical history on file.,   Social History     Tobacco Use    Smoking status: Never Smoker    Smokeless tobacco: Never Used   Substance Use Topics    Alcohol use: Not Currently    Drug use: Never       PHYSICAL EXAMINATION:  [ INSTRUCTIONS:  \"[x]\" Indicates a positive item  \"[]\" Indicates a negative item  -- DELETE ALL ITEMS NOT EXAMINED]  Vital Signs: (As obtained by patient/caregiver or practitioner observation)    Blood pressure-  Heart rate-    Respiratory rate-    Temperature-  Pulse oximetry- Constitutional: [x] Appears well-developed and well-nourished [x] No apparent distress      [] Abnormal-   Mental status  [x] Alert and awake  [x] Oriented to person/place/time [x]Able to follow commands      Eyes:  EOM    [x]  Normal  [] Abnormal-  Sclera  [x]  Normal  [] Abnormal -         Discharge [x]  None visible  [] Abnormal -    HENT:   [x] Normocephalic, atraumatic. [] Abnormal   [x] Mouth/Throat: Mucous membranes are moist.     External Ears [x] Normal  [] Abnormal-     Neck: [x] No visualized mass     Pulmonary/Chest: [x] Respiratory effort normal.  [x] No visualized signs of difficulty breathing or respiratory distress        [] Abnormal-      Musculoskeletal:   [x] Normal gait with no signs of ataxia         [x] Normal range of motion of neck        [] Abnormal-       Neurological:        [x] No Facial Asymmetry (Cranial nerve 7 motor function) (limited exam to video visit)          [x] No gaze palsy        [] Abnormal-         Skin:        [x] No significant exanthematous lesions or discoloration noted on facial skin         [] Abnormal-            Psychiatric:       [x] Normal Affect [x] No Hallucinations        [] Abnormal-     Other pertinent observable physical exam findings-     ASSESSMENT/PLAN:  1. 2 weeks postpartum follow-up    Checking on IUD. F/u in 4 weeks or sooner with any problems. Return in about 4 weeks (around 4/1/2021), or if symptoms worsen or fail to improve, for Postpartum. Poonam Sullivan, was evaluated through a synchronous (real-time) audio-video encounter. The patient (or guardian if applicable) is aware that this is a billable service. Verbal consent to proceed has been obtained within the past 12 months. The visit was conducted pursuant to the emergency declaration under the [address] [place] [address], [address] [place] [address] authority and the [employer] General Act. Patient identification was verified, and a caregiver was present when appropriate. The patient was located in a state where the provider was credentialed to provide care. Total time spent on this encounter: Not billed by time    --ANG Young CNP on 3/4/2021 at 10:34 AM    An electronic signature was used to authenticate this note.

## 2021-03-04 NOTE — PATIENT INSTRUCTIONS
Patient Education        Vaginal Childbirth: Care Instructions  Overview     Vaginal birth means delivering a baby through the birth canal (vagina). During labor, the uterus tightens (contracts) regularly to thin and open the cervix and to push the baby out through the birth canal.  Your body will slowly heal in the next few weeks. It's easy to get too tired and overwhelmed during the first weeks after your baby is born. Changes in your hormones can shift your mood without warning. You may find it hard to meet the extra demands on your energy and time. Take it easy on yourself. Follow-up care is a key part of your treatment and safety. Be sure to make and go to all appointments, and call your doctor if you are having problems. It's also a good idea to know your test results and keep a list of the medicines you take. How can you care for yourself at home? Vaginal bleeding and cramps  · After delivery, you will have a bloody discharge from your vagina. This will turn pink within a week and then white or yellow after about 10 days. It may last for 2 to 4 weeks or longer, until the uterus has healed. Use pads instead of tampons until you stop bleeding. · Don't worry if you pass some blood clots, as long as they are smaller than a golf ball. If you have a tear or stitches in your vaginal area, change the pad at least every 4 hours. This will help prevent soreness and infection. · You may have cramps for the first few days after childbirth. These are normal and occur as the uterus shrinks to normal size. Take an over-the-counter pain medicine, such as acetaminophen (Tylenol), ibuprofen (Advil, Motrin), or naproxen (Aleve), for cramps. Read and follow all instructions on the label. Do not take aspirin, because it can cause more bleeding. · Do not take two or more pain medicines at the same time unless the doctor told you to. Many pain medicines have acetaminophen, which is Tylenol. Too much acetaminophen (Tylenol) can be harmful. Stitches  · If you have stitches, they will dissolve on their own and don't need to be removed. Follow your doctor's instructions for cleaning the stitched area. · Put ice or a cold pack on your painful area for 10 to 20 minutes at a time, several times a day, for the first few days. Put a thin cloth between the ice and your skin. · Sit in a few inches of warm water (sitz bath) 3 times a day and after bowel movements. The warm water helps with pain and itching. If you don't have a tub, a warm shower might help. Breast fullness  · Your breasts may overfill (engorge) in the first few days after delivery. To help milk flow and to relieve pain, warm your breasts in the shower or by using warm, moist towels before nursing. · If you aren't nursing, don't put warmth on your breasts or touch your breasts. Wear a tight bra or sports bra and use ice until the fullness goes away. This usually takes 2 to 3 days. · Put ice or a cold pack on your breast after nursing to reduce swelling and pain. Put a thin cloth between the ice and your skin. Activity  · Eat a balanced diet. Don't try to lose weight by cutting calories. Keep taking your prenatal vitamins, or take a multivitamin. · Get as much rest as you can. Try to take naps when your baby sleeps during the day. · Get some exercise every day. But don't do any heavy exercise until your doctor says it is okay. · Wait until you are healed (about 4 to 6 weeks) before you have sexual intercourse. Your doctor will tell you when it is okay to have sex. · Talk to your doctor about birth control. You can get pregnant even before your period returns. Also, you can get pregnant while you are breastfeeding.   Mental health   · Your vaginal bleeding seems to be getting heavier.     · You have new or worse vaginal discharge.     · You feel sad, anxious, or hopeless for more than a few days.     · You do not get better as expected. Where can you learn more? Go to https://chfabieb.[facility]. org and sign in to your [employer] account. Enter L689 in the [employer] box to learn more about \"Vaginal Childbirth: Care Instructions. \"     If you do not have an account, please click on the \"Sign Up Now\" link. Current as of: February 11, 2020               Content Version: 12.6  © 2399-5945 [employer]. Care instructions adapted under license by [facility] ([facility]). If you have questions about a medical condition or this instruction, always ask your healthcare professional. [address] any warranty or liability for your use of this information.

## 2021-04-06 ENCOUNTER — IMMUNIZATION (OUTPATIENT)
Age: 41
End: 2021-04-06
Payer: COMMERCIAL

## 2021-04-06 PROCEDURE — 91300 COVID-19, PFIZER VACCINE 30MCG/0.3ML DOSE: CPT | Performed by: FAMILY MEDICINE

## 2021-04-06 PROCEDURE — 0001A COVID-19, PFIZER VACCINE 30MCG/0.3ML DOSE: CPT | Performed by: FAMILY MEDICINE

## 2021-04-08 ENCOUNTER — TELEMEDICINE (OUTPATIENT)
Dept: OBGYN CLINIC | Age: 41
End: 2021-04-08

## 2021-04-08 PROCEDURE — 0503F POSTPARTUM CARE VISIT: CPT | Performed by: NURSE PRACTITIONER

## 2021-04-08 ASSESSMENT — ENCOUNTER SYMPTOMS
EYES NEGATIVE: 1
ALLERGIC/IMMUNOLOGIC NEGATIVE: 1
CONSTIPATION: 0
RESPIRATORY NEGATIVE: 1
DIARRHEA: 0
GASTROINTESTINAL NEGATIVE: 1

## 2021-04-08 NOTE — PATIENT INSTRUCTIONS
Tylenol. Too much acetaminophen (Tylenol) can be harmful. Stitches  · If you have stitches, they will dissolve on their own and don't need to be removed. Follow your doctor's instructions for cleaning the stitched area. · Put ice or a cold pack on your painful area for 10 to 20 minutes at a time, several times a day, for the first few days. Put a thin cloth between the ice and your skin. · Sit in a few inches of warm water (sitz bath) 3 times a day and after bowel movements. The warm water helps with pain and itching. If you don't have a tub, a warm shower might help. Breast fullness  · Your breasts may overfill (engorge) in the first few days after delivery. To help milk flow and to relieve pain, warm your breasts in the shower or by using warm, moist towels before nursing. · If you aren't nursing, don't put warmth on your breasts or touch your breasts. Wear a bra that fits well and use ice until the fullness goes away. This usually takes 2 to 3 days. · Put ice or a cold pack on your breast after nursing to reduce swelling and pain. Put a thin cloth between the ice and your skin. Activity  · Eat a balanced diet. Don't try to lose weight by cutting calories. Keep taking your prenatal vitamins, or take a multivitamin. · Get as much rest as you can. Try to take naps when your baby sleeps during the day. · Get some exercise every day. But don't do any heavy exercise until your doctor says it is okay. · Wait until you are healed (about 4 to 6 weeks) before you have sexual intercourse. Your doctor will tell you when it is okay to have sex. · If you don't want to get pregnant, talk to your doctor about birth control. You can get pregnant even before your period returns. Also, you can get pregnant while you are breastfeeding. Mental health  · It's normal to have some sadness, anxiety, sleeplessness, and mood swings after you go home.  If you feel upset or hopeless for more than a few days or are having trouble doing the things you need to do, talk to your doctor. Constipation and hemorrhoids  · Drink plenty of fluids. If you have kidney, heart, or liver disease and have to limit fluids, talk with your doctor before you increase the amount of fluids you drink. · Eat plenty of fiber each day. Have a bran muffin or bran cereal for breakfast. Try eating a piece of fruit for a mid-afternoon snack. · For painful, itchy hemorrhoids, put ice or a cold pack on the area several times a day for 10 minutes at a time. Follow this by putting a warm compress on the area for another 10 to 20 minutes or by sitting in a shallow, warm bath. When should you call for help? Call  911 anytime you think you may need emergency care. For example, call if:    · You have thoughts of harming yourself, your baby, or another person.     · You passed out (lost consciousness).     · You have chest pain, are short of breath, or cough up blood.     · You have a seizure. Call your doctor now or seek immediate medical care if:    · You have severe vaginal bleeding.     · You are dizzy or lightheaded, or you feel like you may faint.     · You have a fever.     · You have new or more pain in your belly or pelvis.     · You have symptoms of a blood clot in your leg (called a deep vein thrombosis), such as:  ? Pain in the calf, back of the knee, thigh, or groin. ? Redness and swelling in your leg or groin.     · You have signs of preeclampsia, such as:  ? Sudden swelling of your face, hands, or feet. ? New vision problems (such as dimness, blurring, or seeing spots). ? A severe headache. Watch closely for changes in your health, and be sure to contact your doctor if:    · Your vaginal bleeding seems to be getting heavier.     · You have new or worse vaginal discharge.     · You feel sad, anxious, or hopeless for more than a few days.     · You do not get better as expected. Where can you learn more? Go to https://yamileth.[facility]-part[facility]. org and sign in to your [employer] account. Enter O680 in the [employer] box to learn more about \"Vaginal Childbirth: Care Instructions. \"     If you do not have an account, please click on the \"Sign Up Now\" link. Current as of: October 8, 2020               Content Version: 12.8  © 9487-6134 [employer]wise, Incorporated. Care instructions adapted under license by [facility] ([facility]). If you have questions about a medical condition or this instruction, always ask your healthcare professional. [address] any warranty or liability for your use of this information.

## 2021-04-08 NOTE — PROGRESS NOTES
2021    TELEHEALTH EVALUATION -- Audio/Visual (During ADIRU-04 public health emergency)    HPI:    Vilma Underwood (:  1980) has requested an audio/video evaluation for the following concern(s):    Pt presents for 6 week PP visit. Denies any problems with pain, recently had first period. Doing well with nursing. Denies any PPD, just feeling more tired. Has 3 small children. Wanting to have the IUD, but still working with insurance. Review of Systems   Constitutional: Negative. HENT: Negative. Eyes: Negative. Respiratory: Negative. Cardiovascular: Negative. Gastrointestinal: Negative. Negative for constipation and diarrhea. Endocrine: Negative. Genitourinary: Negative. Negative for frequency, menstrual problem and urgency. Musculoskeletal: Negative. Skin: Negative. Allergic/Immunologic: Negative. Neurological: Negative. Hematological: Negative. Psychiatric/Behavioral: Negative. All other systems reviewed and are negative. Prior to Visit Medications    Medication Sig Taking? Authorizing Provider   Prenatal Vit-Fe Fumarate-FA (PRENATAL 1+1 PO) Take by mouth  Historical Provider, MD       Social History     Tobacco Use    Smoking status: Never Smoker    Smokeless tobacco: Never Used   Substance Use Topics    Alcohol use: Not Currently    Drug use: Never        No Known Allergies, History reviewed. No pertinent past medical history. , History reviewed. No pertinent surgical history. ,   Social History     Tobacco Use    Smoking status: Never Smoker    Smokeless tobacco: Never Used   Substance Use Topics    Alcohol use: Not Currently    Drug use: Never       PHYSICAL EXAMINATION:  [ INSTRUCTIONS:  \"[x]\" Indicates a positive item  \"[]\" Indicates a negative item  -- DELETE ALL ITEMS NOT EXAMINED]  Vital Signs: (As obtained by patient/caregiver or practitioner observation)    Blood pressure-  Heart rate-    Respiratory rate-    Temperature-  Pulse oximetry-     Constitutional: [x] Appears well-developed and well-nourished [x] No apparent distress      [] Abnormal-   Mental status  [x] Alert and awake  [x] Oriented to person/place/time []Able to follow commands      Eyes:  EOM    [x]  Normal  [] Abnormal-  Sclera  [x]  Normal  [] Abnormal -         Discharge [x]  None visible  [] Abnormal -    HENT:   [x] Normocephalic, atraumatic. [] Abnormal   [x] Mouth/Throat: Mucous membranes are moist.     External Ears [x] Normal  [] Abnormal-     Neck: [x] No visualized mass     Pulmonary/Chest: [x] Respiratory effort normal.  [x] No visualized signs of difficulty breathing or respiratory distress        [] Abnormal-      Musculoskeletal:   [x] Normal gait with no signs of ataxia         [x] Normal range of motion of neck        [] Abnormal-       Neurological:        [x] No Facial Asymmetry (Cranial nerve 7 motor function) (limited exam to video visit)          [x] No gaze palsy        [] Abnormal-         Skin:        [x] No significant exanthematous lesions or discoloration noted on facial skin         [] Abnormal-            Psychiatric:       [x] Normal Affect [x] No Hallucinations        [] Abnormal-     Other pertinent observable physical exam findings-     ASSESSMENT/PLAN:  1. 6 weeks postpartum follow-up    Pt checking into benefits for IUD- is buy and bill. Ok to return to normal ADLs, but would need condoms for back up if sexually active prior to IUD. No follow-ups on file. Castro Valenzuela, was evaluated through a synchronous (real-time) audio-video encounter. The patient (or guardian if applicable) is aware that this is a billable service. Verbal consent to proceed has been obtained within the past 12 months. The visit was conducted pursuant to the emergency declaration under the [facility], [address] [place] [address] authority and the [employer] and [employer] General Act.   Patient

## 2021-04-27 ENCOUNTER — IMMUNIZATION (OUTPATIENT)
Age: 41
End: 2021-04-27
Payer: COMMERCIAL

## 2021-04-27 PROCEDURE — 0002A PR IMM ADMN SARSCOV2 30MCG/0.3ML DIL RECON 2ND DOSE: CPT | Performed by: FAMILY MEDICINE

## 2021-04-27 PROCEDURE — 91300 COVID-19, PFIZER VACCINE 30MCG/0.3ML DOSE: CPT | Performed by: FAMILY MEDICINE

## 2021-06-04 ENCOUNTER — PROCEDURE VISIT (OUTPATIENT)
Dept: OBGYN CLINIC | Age: 41
End: 2021-06-04
Payer: COMMERCIAL

## 2021-06-04 VITALS
BODY MASS INDEX: 29.37 KG/M2 | WEIGHT: 172 LBS | HEART RATE: 85 BPM | DIASTOLIC BLOOD PRESSURE: 81 MMHG | HEIGHT: 64 IN | SYSTOLIC BLOOD PRESSURE: 117 MMHG

## 2021-06-04 DIAGNOSIS — Z30.430 ENCOUNTER FOR IUD INSERTION: Primary | ICD-10-CM

## 2021-06-04 DIAGNOSIS — N92.6 IRREGULAR PERIODS: ICD-10-CM

## 2021-06-04 DIAGNOSIS — Z01.812 PRE-PROCEDURE LAB EXAM: ICD-10-CM

## 2021-06-04 LAB
CONTROL: PRESENT
PREGNANCY TEST URINE, POC: NORMAL

## 2021-06-04 PROCEDURE — 99213 OFFICE O/P EST LOW 20 MIN: CPT | Performed by: NURSE PRACTITIONER

## 2021-06-04 PROCEDURE — 81025 URINE PREGNANCY TEST: CPT | Performed by: NURSE PRACTITIONER

## 2021-06-04 PROCEDURE — 58300 INSERT INTRAUTERINE DEVICE: CPT | Performed by: NURSE PRACTITIONER

## 2021-06-04 ASSESSMENT — ENCOUNTER SYMPTOMS
CONSTIPATION: 0
EYES NEGATIVE: 1
RESPIRATORY NEGATIVE: 1
DIARRHEA: 0
GASTROINTESTINAL NEGATIVE: 1
ALLERGIC/IMMUNOLOGIC NEGATIVE: 1

## 2021-06-04 NOTE — PROGRESS NOTES
Sherrell Pichardo is a 47 y.o. female who presents today for her medical conditions/ complaints as noted below. Sherrell Pichardo is c/o of Procedure (IUD insertion )        HPI  Pt presents for IUD insertion. Patient's last menstrual period was 05/30/2021 (exact date). [id number]    History reviewed. No pertinent past medical history. History reviewed. No pertinent surgical history. History reviewed. No pertinent family history. Social History     Tobacco Use    Smoking status: Never Smoker    Smokeless tobacco: Never Used   Substance Use Topics    Alcohol use: Not Currently       Current Outpatient Medications   Medication Sig Dispense Refill    levonorgestrel (MIRENA, 52 MG,) IUD 52 mg 1 each by Intrauterine route once Indications: inserted on 06/04/2021       No current facility-administered medications for this visit. No Known Allergies  Vitals:    06/04/21 0927   BP: 117/81   Pulse: 85     Body mass index is 29.52 kg/m². Review of Systems   Constitutional: Negative. HENT: Negative. Eyes: Negative. Respiratory: Negative. Cardiovascular: Negative. Gastrointestinal: Negative. Negative for constipation and diarrhea. Endocrine: Negative. Genitourinary: Negative. Negative for frequency, menstrual problem and urgency. Musculoskeletal: Negative. Skin: Negative. Allergic/Immunologic: Negative. Neurological: Negative. Hematological: Negative. Psychiatric/Behavioral: Negative. All other systems reviewed and are negative. Physical Exam  Vitals and nursing note reviewed. Constitutional:       Appearance: She is well-developed. HENT:      Head: Normocephalic.       Right Ear: External ear normal.      Left Ear: External ear normal.      Nose: Nose normal.   Genitourinary:     Comments: IUD Insertion Procedure Note    /81 (Site: Left Upper Arm, Position: Sitting, Cuff Size: Medium Adult)   Pulse 85   Ht 5' 4\" (1.626 m)   Wt 172 lb (78 kg)   LMP 05/30/2021 (Exact Date)   Breastfeeding Yes   BMI 29.52 kg/m²     Pre-operative Diagnosis: irregular menses    Post-operative Diagnosis: same    Indications: same    Procedure Details    Urine pregnancy test was done and result was negative. The risks (including infection, bleeding, pain, and uterine perforation) and benefits of the procedure were explained to the patient and Written informed consent was obtained. Pt placed in lithotomy position. Speculum placed and cervix visualized. Cervix was cleansed with betadine. Single tooth tenaculum was placed on anterior lip. Uterus sounded to 7cm. IUD placed without difficulty. Strings trimmed. Patient tolerated procedure well. IUD Information:  Mirena, Lot # [place] date 8/2023    Condition:  Stable    Complications:  None    Plan:    The patient was advised to call for any fever or for prolonged or severe pain or bleeding. She was advised to use OTC ibuprofen as needed for mild to moderate pain. Attending Physician Documentation:  I was present for or participated in the entire procedure, including opening and closing. Musculoskeletal:         General: Normal range of motion. Cervical back: Normal range of motion. Skin:     General: Skin is warm and dry. Neurological:      Mental Status: She is alert and oriented to person, place, and time. Psychiatric:         Attention and Perception: Attention normal.         Mood and Affect: Mood normal.         Speech: Speech normal.         Behavior: Behavior normal.         Thought Content: Thought content normal.         Cognition and Memory: Cognition normal.         Judgment: Judgment normal.          Diagnosis Orders   1. Encounter for IUD insertion  70797 - [place] INSERT INTRAUTERINE DEVICE   2. Irregular periods  09303 - [place] INSERT INTRAUTERINE DEVICE   3. Pre-procedure lab exam  POCT urine pregnancy       MEDICATIONS:  No orders of the defined types were placed in this encounter. ORDERS:  Orders Placed This Encounter   Procedures    POCT urine pregnancy    72947 - [place] INSERT INTRAUTERINE DEVICE       PLAN:  F/u in 4 weeks for string check    Patient Instructions   Patient Education        levonorgestrel intrauterine system  Pronunciation:  FLORENCIA joy IN tra UE ter ine SIS tem  Brand:  Alfonso Herzog  What is the most important information I should know about levonorgestrel intrauterine system? You should not use this device if you have abnormal vaginal bleeding, a pelvic infection, certain other problems with your uterus or cervix, or if you have breast or uterine cancer, liver disease or liver tumor, or a weak immune system. Do not use during pregnancy. Call your doctor if you think you might be pregnant. What is levonorgestrel intrauterine system? Levonorgestrel is a female hormone that can cause changes in your cervix and uterus. Levonorgestrel intrauterine system is a T-shaped plastic intrauterine device (IUD) that is placed in the uterus where it slowly releases the hormone. Levonorgestrel intrauterine system used to prevent pregnancy for 3 to 6 years. You may use this IUD whether you have children or not. Mirena is also used to treat heavy menstrual bleeding in women who choose to use an intrauterine form of birth control. Levonorgestrel is a progestin and does not contain estrogen. Levonorgestrel intrauterine system should not be used as emergency birth control. Levonorgestrel intrauterine system may also be used for purposes not listed in this medication guide. What should I discuss with my healthcare provider before using levonorgestrel intrauterine system? An IUD can increase your risk of developing a serious pelvic infection, which may threaten your life or your future ability to have children. Ask your doctor about your personal risk. Do not use during pregnancy.  This IUD can cause severe infection, miscarriage, premature birth, or death of have these symptoms longer than 30 minutes. The levonorgestrel device should not interfere with sexual intercourse, wearing tampons, or using other vaginal medications. Your doctor will need to see you within a few weeks after insertion of the device to make sure it is still in place correctly. You will also need regular annual pelvic exams and Pap smears. You may have irregular periods during the first 3 to 6 months of use. Your flow may be lighter or heavier, and you may eventually stop having periods after several months. Tell your doctor if you do not have a period for 6 weeks or if you think you might be pregnant. The IUD may come out by itself. After each menstrual period, make sure you can still feel the removal strings. Wash your hands with soap and water, and insert your clean fingers into the vagina. You should be able to feel the strings at the opening of your cervix. Call your doctor at once if you cannot feel the strings, or if you think the IUD has slipped lower or has come out of your uterus, especially if you also have pain or bleeding. Use a non-hormone method of birth control (condom, diaphragm, cervical cap, or contraceptive sponge) to prevent pregnancy until your doctor is able to replace the IUD. If you need to have an MRI (magnetic resonance imaging), tell your caregivers ahead of time that you have an IUD in place. Your device may be removed at any time you decide to stop using birth control. The Mirena IUD must be removed at the end of the 6-year wearing time. Rashida Bue must be removed after 5 years, and Kesha or Ezzie Cogan must be removed after 3 years. Your doctor can insert a new device at that time if you wish to continue using this form of birth control. Only your doctor should remove the IUD. Do not attempt to remove the device yourself.   If you wish to continue preventing pregnancy, you may need to start using another birth control method a week before your levonorgestrel intrauterine system is removed. What happens if I miss a dose? Since the IUD continuously releases a low dose of levonorgestrel, missing a dose does not occur when using this form of levonorgestrel. What happens if I overdose? An overdose of levonorgestrel released from the intrauterine system is very unlikely to occur. What should I avoid while using levonorgestrel intrauterine system? Avoid having more than one sex partner. The IUD can increase your risk of developing a serious pelvic infection, which is often caused by sexually transmitted disease. Levonorgestrel intrauterine system will not protect you from sexually transmitted diseases, including HIV and AIDS. Using a condom is the only way to help protect yourself from these diseases. Call your doctor if your sex partner develops HIV or a sexually transmitted disease, or if you have any change in sexual relationships. What are the possible side effects of levonorgestrel intrauterine system? Get emergency medical help if you have signs of an allergic reaction: hives; difficult breathing; swelling of your face, lips, tongue, or throat. Get emergency medical help if you have severe pain in your lower stomach or side. This could be a sign of a tubal pregnancy (a pregnancy that implants in the fallopian tube instead of the uterus). A tubal pregnancy is a medical emergency. The levonorgestrel IUD may become embedded into the wall of the uterus, or may perforate (form a hole) in the uterus. If this occurs, the device may no longer prevent pregnancy, or it may move outside the uterus and cause scarring, infection, or damage to other organs. Your doctor may need to surgically remove the device.   Call your doctor at once if you have:  · severe cramps or pelvic pain, pain during sexual intercourse;  · extreme dizziness or light-headed feeling;  · severe migraine headache;  · heavy or ongoing vaginal bleeding, vaginal sores, vaginal discharge that is watery, foul-smelling discharge, or otherwise unusual;  · pale skin, weakness, easy bruising or bleeding, fever, chills, or other signs of infection;  · jaundice (yellowing of the skin or eyes); or  · sudden numbness or weakness (especially on one side of the body), confusion, problems with vision, sensitivity to light. Common side effects may include:  · pelvic pain, vaginal itching or infection, missed or irregular menstrual periods, changes in bleeding patterns or flow (especially during the first 3 to 6 months);  · temporary pain, bleeding, or dizziness during insertion of the IUD;  · ovarian cysts (pelvic pain that disappears within 3 months);  · stomach pain, nausea, vomiting, bloating;  · headache, migraine, depression, mood changes;  · back pain, breast tenderness or pain;  · weight gain, acne, changes in hair growth, loss of interest in sex; or  · puffiness in your face, hands, ankles, or feet. This is not a complete list of side effects and others may occur. Call your doctor for medical advice about side effects. You may report side effects to FDA at 6-848-FDA-5708. What other drugs will affect levonorgestrel intrauterine system? Some drugs can affect your blood levels of levonorgestrel, which could make this form of birth control less effective. Tell your doctor about all your other medicines, including prescription and over-the-counter medicines, vitamins, and herbal products. Tell your doctor about all your current medicines and any medicine you start or stop using. Where can I get more information? Your doctor or pharmacist can provide more information about the levonorgestrel intrauterine system. Remember, keep this and all other medicines out of the reach of children, never share your medicines with others, and use this medication only for the indication prescribed.    Every effort has been made to ensure that the information provided by [address]ne [address] is accurate, up-to-date, and complete, but no guarantee is made to that effect. Drug information contained herein may be time sensitive. [facility] information has been compiled for use by healthcare practitioners and consumers in the [place] and therefore [employer] does not warrant that uses outside of the [place] are appropriate, unless specifically indicated otherwise. [facility]'s drug information does not endorse drugs, diagnose patients or recommend therapy. [facility] drug information is an informational resource designed to assist licensed healthcare practitioners in caring for their patients and/or to serve consumers viewing this service as a supplement to, and not a substitute for, the expertise, skill, knowledge and judgment of healthcare practitioners. The absence of a warning for a given drug or drug combination in no way should be construed to indicate that the drug or drug combination is safe, effective or appropriate for any given patient. [facility] does not assume any responsibility for any aspect of healthcare administered with the aid of information [facility] provides. The information contained herein is not intended to cover all possible uses, directions, precautions, warnings, drug interactions, allergic reactions, or adverse effects. If you have questions about the drugs you are taking, check with your doctor, nurse or pharmacist.  Copyright 9355-6321 [address]. Version: 8.01. Revision date: 12/9/2020. Care instructions adapted under license by [facility] ([facility]). If you have questions about a medical condition or this instruction, always ask your healthcare professional. [place] [address] any warranty or liability for your use of this information.

## 2021-06-04 NOTE — PATIENT INSTRUCTIONS
Patient Education        levonorgestrel intrauterine system  Pronunciation:  FLORENCIA joy IN tra UE ter ine SIS tem  Brand:  Sweetie Guillaume  What is the most important information I should know about levonorgestrel intrauterine system? You should not use this device if you have abnormal vaginal bleeding, a pelvic infection, certain other problems with your uterus or cervix, or if you have breast or uterine cancer, liver disease or liver tumor, or a weak immune system. Do not use during pregnancy. Call your doctor if you think you might be pregnant. What is levonorgestrel intrauterine system? Levonorgestrel is a female hormone that can cause changes in your cervix and uterus. Levonorgestrel intrauterine system is a T-shaped plastic intrauterine device (IUD) that is placed in the uterus where it slowly releases the hormone. Levonorgestrel intrauterine system used to prevent pregnancy for 3 to 6 years. You may use this IUD whether you have children or not. Mirena is also used to treat heavy menstrual bleeding in women who choose to use an intrauterine form of birth control. Levonorgestrel is a progestin and does not contain estrogen. Levonorgestrel intrauterine system should not be used as emergency birth control. Levonorgestrel intrauterine system may also be used for purposes not listed in this medication guide. What should I discuss with my healthcare provider before using levonorgestrel intrauterine system? An IUD can increase your risk of developing a serious pelvic infection, which may threaten your life or your future ability to have children. Ask your doctor about your personal risk. Do not use during pregnancy. This IUD can cause severe infection, miscarriage, premature birth, or death of the mother if left in place during pregnancy. Tell your doctor right away if you become pregnant.   If you continue a pregnancy that occurs while using this IUD, watch for signs such as fever, chills, cramps, vaginal bleeding or discharge. You should not use this device if you are allergic to levonorgestrel, silicone, silica, silver, barium, iron oxide, or polyethylene, or if you have:  · abnormal vaginal bleeding that has not been checked by a doctor;  · an untreated or uncontrolled pelvic infection (vaginal, cervical, uterine);  · endometriosis or a serious pelvic infection following a pregnancy or  in the past 3 months;  · pelvic inflammatory disease (PID), unless you had a normal pregnancy after the infection was treated and cleared;  · uterine fibroid tumors or conditions that affect the shape of the uterus;  · past or present cancer of the breast, cervix, or uterus;  · liver disease or liver tumor (benign or malignant);  · a condition that weakens your immune system, such as AIDS, leukemia, or IV drug abuse; or  · if you have another intrauterine device (IUD) in place. Tell your doctor if you have ever had:  · high blood pressure, heart problems, a heart valve disorder, a heart attack or stroke;  · bleeding problems;  · migraine headaches; or  · a vaginal infection, pelvic infection, or sexually transmitted disease. You should not use this IUD if you are breastfeeding a baby younger than 7 weeks old. This IUD may be more likely to form a hole or get embedded in the wall of your uterus if you have the device inserted while you are breastfeeding. How is levonorgestrel intrauterine system used? The levonorgestrel IUD is inserted through the vagina and placed into the uterus by a doctor. The device is usually inserted within 7 days after the start of a menstrual period. You may feel pain or dizziness during insertion of the IUD. You may also have minor vaginal bleeding. Tell your doctor if you still have these symptoms longer than 30 minutes. The levonorgestrel device should not interfere with sexual intercourse, wearing tampons, or using other vaginal medications.   Your doctor will need to see you within a few weeks after insertion of the device to make sure it is still in place correctly. You will also need regular annual pelvic exams and Pap smears. You may have irregular periods during the first 3 to 6 months of use. Your flow may be lighter or heavier, and you may eventually stop having periods after several months. Tell your doctor if you do not have a period for 6 weeks or if you think you might be pregnant. The IUD may come out by itself. After each menstrual period, make sure you can still feel the removal strings. Wash your hands with soap and water, and insert your clean fingers into the vagina. You should be able to feel the strings at the opening of your cervix. Call your doctor at once if you cannot feel the strings, or if you think the IUD has slipped lower or has come out of your uterus, especially if you also have pain or bleeding. Use a non-hormone method of birth control (condom, diaphragm, cervical cap, or contraceptive sponge) to prevent pregnancy until your doctor is able to replace the IUD. If you need to have an MRI (magnetic resonance imaging), tell your caregivers ahead of time that you have an IUD in place. Your device may be removed at any time you decide to stop using birth control. The Mirena IUD must be removed at the end of the 6-year wearing time. [facility] must be removed after 5 years, and [facility] or [facility] must be removed after 3 years. Your doctor can insert a new device at that time if you wish to continue using this form of birth control. Only your doctor should remove the IUD. Do not attempt to remove the device yourself. If you wish to continue preventing pregnancy, you may need to start using another birth control method a week before your levonorgestrel intrauterine system is removed. What happens if I miss a dose?   Since the IUD continuously releases a low dose of levonorgestrel, missing a dose does not occur when using this form of levonorgestrel. What happens if I overdose? An overdose of levonorgestrel released from the intrauterine system is very unlikely to occur. What should I avoid while using levonorgestrel intrauterine system? Avoid having more than one sex partner. The IUD can increase your risk of developing a serious pelvic infection, which is often caused by sexually transmitted disease. Levonorgestrel intrauterine system will not protect you from sexually transmitted diseases, including HIV and AIDS. Using a condom is the only way to help protect yourself from these diseases. Call your doctor if your sex partner develops HIV or a sexually transmitted disease, or if you have any change in sexual relationships. What are the possible side effects of levonorgestrel intrauterine system? Get emergency medical help if you have signs of an allergic reaction: hives; difficult breathing; swelling of your face, lips, tongue, or throat. Get emergency medical help if you have severe pain in your lower stomach or side. This could be a sign of a tubal pregnancy (a pregnancy that implants in the fallopian tube instead of the uterus). A tubal pregnancy is a medical emergency. The levonorgestrel IUD may become embedded into the wall of the uterus, or may perforate (form a hole) in the uterus. If this occurs, the device may no longer prevent pregnancy, or it may move outside the uterus and cause scarring, infection, or damage to other organs. Your doctor may need to surgically remove the device.   Call your doctor at once if you have:  · severe cramps or pelvic pain, pain during sexual intercourse;  · extreme dizziness or light-headed feeling;  · severe migraine headache;  · heavy or ongoing vaginal bleeding, vaginal sores, vaginal discharge that is watery, foul-smelling discharge, or otherwise unusual;  · pale skin, weakness, easy bruising or bleeding, fever, chills, or other signs of infection;  · jaundice (yellowing of the skin or eyes); or  · sudden numbness or weakness (especially on one side of the body), confusion, problems with vision, sensitivity to light. Common side effects may include:  · pelvic pain, vaginal itching or infection, missed or irregular menstrual periods, changes in bleeding patterns or flow (especially during the first 3 to 6 months);  · temporary pain, bleeding, or dizziness during insertion of the IUD;  · ovarian cysts (pelvic pain that disappears within 3 months);  · stomach pain, nausea, vomiting, bloating;  · headache, migraine, depression, mood changes;  · back pain, breast tenderness or pain;  · weight gain, acne, changes in hair growth, loss of interest in sex; or  · puffiness in your face, hands, ankles, or feet. This is not a complete list of side effects and others may occur. Call your doctor for medical advice about side effects. You may report side effects to FDA at 0-688-FDA-6416. What other drugs will affect levonorgestrel intrauterine system? Some drugs can affect your blood levels of levonorgestrel, which could make this form of birth control less effective. Tell your doctor about all your other medicines, including prescription and over-the-counter medicines, vitamins, and herbal products. Tell your doctor about all your current medicines and any medicine you start or stop using. Where can I get more information? Your doctor or pharmacist can provide more information about the levonorgestrel intrauterine system. Remember, keep this and all other medicines out of the reach of children, never share your medicines with others, and use this medication only for the indication prescribed. Every effort has been made to ensure that the information provided by Jayne Mccray Dr is accurate, up-to-date, and complete, but no guarantee is made to that effect. Drug information contained herein may be time sensitive.  [employer] information has been compiled for use by healthcare practitioners and consumers in the [place] and therefore [employer] does not warrant that uses outside of the [place] are appropriate, unless specifically indicated otherwise. [employer]'s drug information does not endorse drugs, diagnose patients or recommend therapy. [employer] drug information is an informational resource designed to assist licensed healthcare practitioners in caring for their patients and/or to serve consumers viewing this service as a supplement to, and not a substitute for, the expertise, skill, knowledge and judgment of healthcare practitioners. The absence of a warning for a given drug or drug combination in no way should be construed to indicate that the drug or drug combination is safe, effective or appropriate for any given patient. [facility] does not assume any responsibility for any aspect of healthcare administered with the aid of information [facility] provides. The information contained herein is not intended to cover all possible uses, directions, precautions, warnings, drug interactions, allergic reactions, or adverse effects. If you have questions about the drugs you are taking, check with your doctor, nurse or pharmacist.  Copyright 6210-3063 [address]. Version: 8.01. Revision date: 12/9/2020. Care instructions adapted under license by [facility] ([facility]). If you have questions about a medical condition or this instruction, always ask your healthcare professional. [place] [address] any warranty or liability for your use of this information.

## 2021-07-06 ENCOUNTER — OFFICE VISIT (OUTPATIENT)
Dept: OBGYN CLINIC | Age: 41
End: 2021-07-06
Payer: COMMERCIAL

## 2021-07-06 VITALS
BODY MASS INDEX: 34.36 KG/M2 | WEIGHT: 175 LBS | HEART RATE: 102 BPM | DIASTOLIC BLOOD PRESSURE: 76 MMHG | SYSTOLIC BLOOD PRESSURE: 132 MMHG | HEIGHT: 60 IN

## 2021-07-06 DIAGNOSIS — N92.6 IRREGULAR BLEEDING: ICD-10-CM

## 2021-07-06 DIAGNOSIS — Z30.431 INTRAUTERINE DEVICE SURVEILLANCE: Primary | ICD-10-CM

## 2021-07-06 PROCEDURE — 99213 OFFICE O/P EST LOW 20 MIN: CPT | Performed by: NURSE PRACTITIONER

## 2021-07-06 ASSESSMENT — ENCOUNTER SYMPTOMS
EYES NEGATIVE: 1
GASTROINTESTINAL NEGATIVE: 1
RESPIRATORY NEGATIVE: 1

## 2021-07-06 NOTE — PATIENT INSTRUCTIONS
vagina and feel for the cervix, which is at the top of the vagina and feels harder than the rest of your vagina. You should be able to feel the thin, plastic string coming out of the opening of your cervix. If you cannot feel the string, use another form of birth control and make an appointment with your doctor to have the string checked. · If the IUD comes out, save it and call your doctor. Be sure to use another form of birth control while the IUD is out. · Use latex condoms to protect against sexually transmitted infections (STIs), such as gonorrhea and chlamydia. An IUD does not protect you from STIs. Having one sex partner (who does not have STIs and does not have sex with anyone else) is a good way to avoid STIs. When should you call for help? Call 911 anytime you think you may need emergency care. For example, call if:    · You passed out (lost consciousness).     · You have sudden, severe pain in your belly or pelvis. Call your doctor now or seek immediate medical care if:    · You have new belly or pelvic pain.     · You have severe vaginal bleeding. This means that you are soaking through your usual pads or tampons each hour for 2 or more hours.     · You are dizzy or lightheaded, or you feel like you may faint.     · You have a fever and pelvic pain or vaginal discharge.     · You have pelvic pain that is getting worse. Watch closely for changes in your health, and be sure to contact your doctor if:    · You cannot feel the string, or the IUD comes out.     · You feel sick to your stomach, or you vomit.     · You think you may be pregnant. Where can you learn more? Go to [email or web address] and sign in to your [employer] account. Enter P720 in the [employer] box to learn more about \"Intrauterine Device (IUD) Insertion: Care Instructions. \"     If you do not have an account, please click on the \"Sign Up Now\" link.   Current as of: October 8, 2020               Content Version: 12.9  © 0997-2328 Healthwise, Incorporated. Care instructions adapted under license by [facility] ([facility]). If you have questions about a medical condition or this instruction, always ask your healthcare professional. [address] any warranty or liability for your use of this information.

## 2021-07-06 NOTE — PROGRESS NOTES
Huber Bhat is a 46 y.o. female who presents today for her medical conditions/ complaints as noted below. Huber Bhat is c/o of Follow-up (string check )        HPI  Patient presents today for IUD string check. IUD placed 06/04/21 by ML. She states her period lasted for 12 days after IUD insertion but cramping has lightened, just lasts longer. No LMP recorded. [id number]    History reviewed. No pertinent past medical history. History reviewed. No pertinent surgical history. History reviewed. No pertinent family history. Social History     Tobacco Use    Smoking status: Never Smoker    Smokeless tobacco: Never Used   Substance Use Topics    Alcohol use: Not Currently       Current Outpatient Medications   Medication Sig Dispense Refill    levonorgestrel (MIRENA, 52 MG,) IUD 52 mg 1 each by Intrauterine route once Indications: inserted on 06/04/2021       No current facility-administered medications for this visit. No Known Allergies  Vitals:    07/06/21 1339   BP: 132/76   Pulse: 102     Body mass index is 33.78 kg/m². Review of Systems   Constitutional: Negative. HENT: Negative. Eyes: Negative. Respiratory: Negative. Cardiovascular: Negative. Gastrointestinal: Negative. Genitourinary: Positive for menstrual problem. Negative for difficulty urinating, dyspareunia, dysuria, enuresis, frequency, hematuria, pelvic pain, urgency and vaginal discharge. Musculoskeletal: Negative. Skin: Negative. Neurological: Negative. Psychiatric/Behavioral: Negative. Physical Exam  Vitals and nursing note reviewed. Constitutional:       General: She is not in acute distress. Appearance: She is well-developed. She is not diaphoretic. HENT:      Head: Normocephalic and atraumatic. Eyes:      Conjunctiva/sclera: Conjunctivae normal.      Pupils: Pupils are equal, round, and reactive to light.    Pulmonary:      Effort: Pulmonary effort is normal.   Abdominal: Tenderness: There is no guarding. Genitourinary:     Comments: iud strings noted at os  Musculoskeletal:         General: Normal range of motion. Cervical back: Normal range of motion. Comments: Normal ROM in all 4 extremities; normal gait   Skin:     General: Skin is warm and dry. Neurological:      Mental Status: She is alert and oriented to person, place, and time. Motor: No abnormal muscle tone. Coordination: Coordination normal.   Psychiatric:         Behavior: Behavior normal.          Diagnosis Orders   1. Intrauterine device surveillance     2. Irregular bleeding         MEDICATIONS:  No orders of the defined types were placed in this encounter. ORDERS:  No orders of the defined types were placed in this encounter. PLAN:  Reassurance not uncommon to have some irregular bleeding and that it should subside by about 3 months. To set up physical in about 3 months and good time to recheck on IUD at that time  Patient Instructions     Patient Education        Intrauterine Device (IUD) Insertion: Care Instructions  Your Care Instructions     An intrauterine device (IUD) is a very effective method of birth control. It is a small, plastic, T-shaped device that contains copper or hormones. The doctor inserts the IUD into your uterus. You can have an IUD inserted at any time, as long as you aren't pregnant and you don't have a pelvic infection. If you and your doctor discuss it before you give birth, this can be done right after you have your baby. A plastic string tied to the end of the IUD hangs down through the cervix into the vagina. Your doctor may have you feel for the IUD string right after insertion, to be sure you know what it feels like. There are two types of IUDs. The copper IUD works for up to 10 years. The hormonal IUD works for either 3 years or 6 years, depending on which IUD is used. But your doctor may talk to you about leaving it in for longer.  The hormonal IUD also reduces menstrual bleeding and cramping. Follow-up care is a key part of your treatment and safety. Be sure to make and go to all appointments, and call your doctor if you are having problems. It's also a good idea to know your test results and keep a list of the medicines you take. How can you care for yourself at home? · It's safe to use while breastfeeding. · You may experience some mild cramping and light bleeding (spotting) for 1 or 2 days. Use a hot water bottle or a heating pad set on low on your belly for pain. · Take an over-the-counter pain medicine, such as acetaminophen (Tylenol), ibuprofen (Advil, Motrin), and naproxen (Aleve) if needed. Read and follow all instructions on the label. · Do not take two or more pain medicines at the same time unless the doctor told you to. Many pain medicines have acetaminophen, which is Tylenol. Too much acetaminophen (Tylenol) can be harmful. · If you want to check the string of your IUD, insert a finger into your vagina and feel for the cervix, which is at the top of the vagina and feels harder than the rest of your vagina. You should be able to feel the thin, plastic string coming out of the opening of your cervix. If you cannot feel the string, use another form of birth control and make an appointment with your doctor to have the string checked. · If the IUD comes out, save it and call your doctor. Be sure to use another form of birth control while the IUD is out. · Use latex condoms to protect against sexually transmitted infections (STIs), such as gonorrhea and chlamydia. An IUD does not protect you from STIs. Having one sex partner (who does not have STIs and does not have sex with anyone else) is a good way to avoid STIs. When should you call for help? Call 911 anytime you think you may need emergency care. For example, call if:    · You passed out (lost consciousness).     · You have sudden, severe pain in your belly or pelvis.    Call your doctor now or seek immediate medical care if:    · You have new belly or pelvic pain.     · You have severe vaginal bleeding. This means that you are soaking through your usual pads or tampons each hour for 2 or more hours.     · You are dizzy or lightheaded, or you feel like you may faint.     · You have a fever and pelvic pain or vaginal discharge.     · You have pelvic pain that is getting worse. Watch closely for changes in your health, and be sure to contact your doctor if:    · You cannot feel the string, or the IUD comes out.     · You feel sick to your stomach, or you vomit.     · You think you may be pregnant. Where can you learn more? Go to [email or web address] and sign in to your [employer] account. Enter O940 in the [employer] box to learn more about \"Intrauterine Device (IUD) Insertion: Care Instructions. \"     If you do not have an account, please click on the \"Sign Up Now\" link. Current as of: October 8, 2020               Content Version: 12.9  © 2006-2021 Healthwise, Incorporated. Care instructions adapted under license by [facility] ([facility]). If you have questions about a medical condition or this instruction, always ask your healthcare professional. [address] any warranty or liability for your use of this information.

## 2022-03-22 ENCOUNTER — OFFICE VISIT (OUTPATIENT)
Dept: OBGYN CLINIC | Age: 42
End: 2022-03-22
Payer: COMMERCIAL

## 2022-03-22 VITALS
HEIGHT: 63 IN | DIASTOLIC BLOOD PRESSURE: 77 MMHG | SYSTOLIC BLOOD PRESSURE: 123 MMHG | HEART RATE: 84 BPM | WEIGHT: 161 LBS | BODY MASS INDEX: 28.53 KG/M2

## 2022-03-22 DIAGNOSIS — Z01.419 WELL WOMAN EXAM WITH ROUTINE GYNECOLOGICAL EXAM: Primary | ICD-10-CM

## 2022-03-22 DIAGNOSIS — Z97.5 IUD (INTRAUTERINE DEVICE) IN PLACE: ICD-10-CM

## 2022-03-22 DIAGNOSIS — Z12.4 SCREENING FOR CERVICAL CANCER: ICD-10-CM

## 2022-03-22 DIAGNOSIS — Z11.51 SCREENING FOR HPV (HUMAN PAPILLOMAVIRUS): ICD-10-CM

## 2022-03-22 DIAGNOSIS — Q83.1 AXILLARY ACCESSORY BREAST TISSUE: ICD-10-CM

## 2022-03-22 PROCEDURE — 99395 PREV VISIT EST AGE 18-39: CPT | Performed by: OBSTETRICS & GYNECOLOGY

## 2022-03-22 ASSESSMENT — ENCOUNTER SYMPTOMS
RESPIRATORY NEGATIVE: 1
EYES NEGATIVE: 1
GASTROINTESTINAL NEGATIVE: 1

## 2022-03-22 NOTE — PROGRESS NOTES
I, Anna Chou RN, am scribing for and in the presence of Dr. Flavia Hull 3/22/2022/3:07 PM/sign         3/22/2022    Elmer Jackson (:  1980) is a 46 y.o. female, here for a preventive medicine evaluation. She stopped nursing her son about 2 months ago. He was 1 yr ago last month. She is having some left armpit swelling. She states she had swelling to that area when her milk came in. Once she was nursing for a little bit then the swelling improved. She had Mirena placed last year after delivery. She is not having periods, will have occasional spotting. Patient Active Problem List   Diagnosis    39 weeks gestation of pregnancy    Uterine contractions during pregnancy     (normal spontaneous vaginal delivery)       Review of Systems   Constitutional: Negative. HENT: Negative. Eyes: Negative. Respiratory: Negative. Cardiovascular: Negative. Gastrointestinal: Negative. Genitourinary: Negative for difficulty urinating, dyspareunia, dysuria, enuresis, frequency, hematuria, menstrual problem, pelvic pain, urgency and vaginal discharge. Musculoskeletal: Negative. Skin: Negative. Neurological: Negative. Psychiatric/Behavioral: Negative. Prior to Visit Medications    Medication Sig Taking? Authorizing Provider   levonorgestrel (MIRENA, 52 MG,) IUD 52 mg 1 each by Intrauterine route once Indications: inserted on 2021 Yes Historical Provider, MD        No Known Allergies    History reviewed. No pertinent past medical history. History reviewed. No pertinent surgical history.     Social History     Socioeconomic History    Marital status:      Spouse name: Not on file    Number of children: Not on file    Years of education: Not on file    Highest education level: Not on file   Occupational History    Not on file   Tobacco Use    Smoking status: Never Smoker    Smokeless tobacco: Never Used   Vaping Use    Vaping Use: Never used   Substance and Sexual Activity    Alcohol use: Not Currently    Drug use: Never    Sexual activity: Yes     Partners: Male   Other Topics Concern    Not on file   Social History Narrative    Not on file     Social Determinants of Health     Financial Resource Strain:     Difficulty of Paying Living Expenses: Not on file   Food Insecurity:     Worried About Running Out of Food in the Last Year: Not on file    Venessa of Food in the Last Year: Not on file   Transportation Needs:     Lack of Transportation (Medical): Not on file    Lack of Transportation (Non-Medical): Not on file   Physical Activity:     Days of Exercise per Week: Not on file    Minutes of Exercise per Session: Not on file   Stress:     Feeling of Stress : Not on file   Social Connections:     Frequency of Communication with Friends and Family: Not on file    Frequency of Social Gatherings with Friends and Family: Not on file    Attends Shinto Services: Not on file    Active Member of [address] [place] [address] or Organizations: Not on file    Attends Club or Organization Meetings: Not on file    Marital Status: Not on file   Intimate Partner Violence:     Fear of Current or Ex-Partner: Not on file    Emotionally Abused: Not on file    Physically Abused: Not on file    Sexually Abused: Not on file   Housing Stability:     Unable to Pay for Housing in the Last Year: Not on file    Number of [place] in the Last Year: Not on file    Unstable Housing in the Last Year: Not on file        History reviewed. No pertinent family history. ADVANCE DIRECTIVE: N, <no information>    Vitals:    03/22/22 1339   BP: 123/77   Site: Left Upper Arm   Position: Sitting   Cuff Size: Medium Adult   Pulse: 84   Weight: 161 lb (73 kg)   Height: 5' 3\" (1.6 m)     Estimated body mass index is 28.52 kg/m² as calculated from the following:    Height as of this encounter: 5' 3\" (1.6 m). Weight as of this encounter: 161 lb (73 kg).     Physical Exam  Constitutional: General: She is not in acute distress. Appearance: She is well-developed. HENT:      Head: Normocephalic and atraumatic. Right Ear: Hearing normal.      Left Ear: Hearing normal.      Nose: Nose normal.   Eyes:      General: Lids are normal.      Conjunctiva/sclera: Conjunctivae normal.      Pupils: Pupils are equal, round, and reactive to light. Neck:      Thyroid: No thyromegaly. Trachea: No tracheal deviation. Cardiovascular:      Rate and Rhythm: Normal rate and regular rhythm. Heart sounds: Normal heart sounds. Pulmonary:      Effort: Pulmonary effort is normal. No respiratory distress. Breath sounds: Normal breath sounds. No wheezing. Chest:   Breasts: Breasts are symmetrical.      Right: No inverted nipple, mass, nipple discharge, skin change, tenderness or supraclavicular adenopathy. Left: No inverted nipple, mass, nipple discharge, skin change, tenderness or supraclavicular adenopathy. Abdominal:      General: There is no distension. Palpations: Abdomen is soft. Tenderness: There is no abdominal tenderness. There is no guarding or rebound. Genitourinary:     Labia:         Right: No rash, tenderness or lesion. Left: No rash, tenderness or lesion. Vagina: No vaginal discharge or bleeding. Cervix: No cervical motion tenderness, discharge or friability. Uterus: Not deviated, not enlarged, not fixed and not tender. Adnexa:         Right: No mass, tenderness or fullness. Left: No mass, tenderness or fullness. Rectum: No anal fissure or external hemorrhoid. Comments: Specimen for cervical cytology obtained. IUD strings visualized   Musculoskeletal:         General: Normal range of motion. Cervical back: Normal range of motion and neck supple.       Comments: Normal ROM in all four extremities; normal gait   Lymphadenopathy:      Head:      Right side of head: No submental, submandibular or tonsillar adenopathy. Left side of head: No submental, submandibular or tonsillar adenopathy. Cervical: No cervical adenopathy. Upper Body:      Right upper body: No supraclavicular adenopathy. Left upper body: No supraclavicular adenopathy. Skin:     General: Skin is warm and dry. Findings: No erythema or rash. Comments: Axillary mammary tissue present to left axilla    Neurological:      Mental Status: She is alert and oriented to person, place, and time. Psychiatric:         Behavior: Behavior normal.         No flowsheet data found. No results found for: CHOL, CHOLFAST, TRIG, TRIGLYCFAST, HDL, LDLCHOLESTEROL, LDLCALC, GLUF, GLUCOSE, LABA1C    The ASCVD Risk score (Roxana West., et al., 2013) failed to calculate for the following reasons:     The 2013 ASCVD risk score is only valid for ages 36 to 78    Immunization History   Administered Date(s) Administered    COVID-19, [email or web address] top, DILUTE for use, 12+ yrs, 30mcg/0.3mL dose 04/06/2021, 04/27/2021    Influenza, Quadv, IM, PF (6 mo and older Fluzone, Flulaval, Fluarix, and 3 yrs and older Afluria) 11/11/2020    Tdap (Boostrix, Adacel) 12/09/2020       Health Maintenance   Topic Date Due    Hepatitis C screen  Never done    Varicella vaccine (1 of 2 - 2-dose childhood series) Never done    Depression Screen  Never done    HIV screen  Never done    Diabetes screen  Never done    COVID-19 Vaccine (3 - Booster for [place] Peter series) 09/27/2021    Cervical cancer screen  03/22/2027    DTaP/Tdap/Td vaccine (2 - Td or Tdap) 12/09/2030    Flu vaccine  Completed    Hepatitis A vaccine  Aged Out    Hepatitis B vaccine  Aged Out    Hib vaccine  Aged Out    Meningococcal (ACWY) vaccine  Aged Out    Pneumococcal 0-64 years Vaccine  Aged Out       Assessment & Plan   Well woman exam with routine gynecological exam  -     PAP SMEAR  Screening for cervical cancer  -     PAP SMEAR  Screening for HPV (human papillomavirus)  -     Human papillomavirus (HPV) DNA Probe Thin Prep High Risk  IUD (intrauterine device) in place  Axillary accessory breast tissue    Pap obtained  Baseline mammogram ordered  Pt will continue to monitor left axilla area and can refer to general surgery if area still present over the next couple of months. All questions answered    Return in about 1 year (around 3/22/2023), or if symptoms worsen or fail to improve. Kelli Evans MD, personally performed services described in this document as scribed by Wolf Swanson RN in my presence, and it is both accurate and complete.

## 2022-03-22 NOTE — PATIENT INSTRUCTIONS
Patient Education        Well Visit, Ages 25 to 48: Care Instructions  Overview     Well visits can help you stay healthy. Your doctor has checked your overall health and may have suggested ways to take good care of yourself. Your doctor also may have recommended tests. At home, you can help prevent illness with healthy eating, regular exercise, and other steps. Follow-up care is a key part of your treatment and safety. Be sure to make and go to all appointments, and call your doctor if you are having problems. It's also a good idea to know your test results and keep a list of the medicines you take. How can you care for yourself at home? · Get screening tests that you and your doctor decide on. Screening helps find diseases before any symptoms appear. · Eat healthy foods. Choose fruits, vegetables, whole grains, protein, and low-fat dairy foods. Limit fat, especially saturated fat. Reduce salt in your diet. · Limit alcohol. If you are a man, have no more than 2 drinks a day or 14 drinks a week. If you are a woman, have no more than 1 drink a day or 7 drinks a week. · Get at least 30 minutes of physical activity on most days of the week. Walking is a good choice. You also may want to do other activities, such as running, swimming, cycling, or playing tennis or team sports. Discuss any changes in your exercise program with your doctor. · Reach and stay at a healthy weight. This will lower your risk for many problems, such as obesity, diabetes, heart disease, and high blood pressure. · Do not smoke or allow others to smoke around you. If you need help quitting, talk to your doctor about stop-smoking programs and medicines. These can increase your chances of quitting for good. · Care for your mental health. It is easy to get weighed down by worry and stress. Learn strategies to manage stress, like deep breathing and mindfulness, and stay connected with your family and community.  If you find you often feel sad or hopeless, talk with your doctor. Treatment can help. · Talk to your doctor about whether you have any risk factors for sexually transmitted infections (STIs). You can help prevent STIs if you wait to have sex with a new partner (or partners) until you've each been tested for STIs. It also helps if you use condoms (male or female condoms) and if you limit your sex partners to one person who only has sex with you. Vaccines are available for some STIs, such as HPV. · Use birth control if it's important to you to prevent pregnancy. Talk with your doctor about the choices available and what might be best for you. · If you think you may have a problem with alcohol or drug use, talk to your doctor. This includes prescription medicines (such as amphetamines and opioids) and illegal drugs (such as cocaine and methamphetamine). Your doctor can help you figure out what type of treatment is best for you. · Protect your skin from too much sun. When you're outdoors from 10 a.m. to 4 p.m., stay in the shade or cover up with clothing and a hat with a wide brim. Wear sunglasses that block UV rays. Even when it's cloudy, put broad-spectrum sunscreen (SPF 30 or higher) on any exposed skin. · See a dentist one or two times a year for checkups and to have your teeth cleaned. · Wear a seat belt in the car. When should you call for help? Watch closely for changes in your health, and be sure to contact your doctor if you have any problems or symptoms that concern you. Where can you learn more? Go to [email or web address] and sign in to your [facility] account. Enter P072 in the [place]er box to learn more about \"Well Visit, Ages 25 to 48: Care Instructions. \"     If you do not have an account, please click on the \"Sign Up Now\" link. Current as of: October 6, 2021               Content Version: 13.1  © 3499-0485 Healthwise, Incorporated. Care instructions adapted under license by [facility] ([facility]). If you have questions about a medical condition or this instruction, always ask your healthcare professional. [place] [address] any warranty or liability for your use of this information.

## 2022-03-30 LAB
HPV COMMENT: NORMAL
HPV TYPE 16: NOT DETECTED
HPV TYPE 18: NOT DETECTED
HPVOH (OTHER TYPES): NOT DETECTED

## 2022-08-29 ENCOUNTER — TELEPHONE (OUTPATIENT)
Dept: INTERNAL MEDICINE | Age: 42
End: 2022-08-29

## 2022-08-29 NOTE — TELEPHONE ENCOUNTER
----- Message from Markie Phipps sent at 8/29/2022 11:25 AM CDT -----  Subject: Appointment Request    Reason for Call: New Patient/New to Provider Appointment needed: New   Patient Request Appointment    QUESTIONS    Reason for appointment request? Available appointments did not meet   patient need     Additional Information for Provider? Patient called in to establish care   with Dr. Melany Batista ,she is in no rush but would like to establish care   with her.  Please advise   ---------------------------------------------------------------------------  --------------  Michael RAMIREZ  [phone]; OK to leave message on voicemail  ---------------------------------------------------------------------------  --------------  SCRIPT ANSWERS  GERHARDID Screen: Den Montoya

## 2022-08-29 NOTE — TELEPHONE ENCOUNTER
Dr. Neymar Flaherty is not accepting new patients; I can get her with Dr. Marolyn Burkitt in October.

## 2022-12-01 ENCOUNTER — OFFICE VISIT (OUTPATIENT)
Dept: PRIMARY CARE CLINIC | Age: 42
End: 2022-12-01
Payer: COMMERCIAL

## 2022-12-01 VITALS
BODY MASS INDEX: 27.93 KG/M2 | TEMPERATURE: 98.4 F | SYSTOLIC BLOOD PRESSURE: 108 MMHG | HEIGHT: 64 IN | OXYGEN SATURATION: 100 % | DIASTOLIC BLOOD PRESSURE: 78 MMHG | HEART RATE: 95 BPM | WEIGHT: 163.6 LBS

## 2022-12-01 DIAGNOSIS — Z82.71 FAMILY HISTORY OF POLYCYSTIC KIDNEY DISEASE: ICD-10-CM

## 2022-12-01 DIAGNOSIS — Z80.3 FAMILY HISTORY OF BREAST CANCER: ICD-10-CM

## 2022-12-01 DIAGNOSIS — J30.2 SEASONAL ALLERGIES: ICD-10-CM

## 2022-12-01 DIAGNOSIS — Z76.89 ESTABLISHING CARE WITH NEW DOCTOR, ENCOUNTER FOR: Primary | ICD-10-CM

## 2022-12-01 DIAGNOSIS — J33.9 NASAL POLYP: ICD-10-CM

## 2022-12-01 DIAGNOSIS — R09.81 NASAL CONGESTION: ICD-10-CM

## 2022-12-01 DIAGNOSIS — Z12.31 ENCOUNTER FOR SCREENING MAMMOGRAM FOR MALIGNANT NEOPLASM OF BREAST: ICD-10-CM

## 2022-12-01 PROBLEM — Z3A.39 39 WEEKS GESTATION OF PREGNANCY: Status: RESOLVED | Noted: 2021-02-16 | Resolved: 2022-12-01

## 2022-12-01 PROBLEM — O47.9 UTERINE CONTRACTIONS DURING PREGNANCY: Status: RESOLVED | Noted: 2021-02-16 | Resolved: 2022-12-01

## 2022-12-01 PROCEDURE — 99204 OFFICE O/P NEW MOD 45 MIN: CPT | Performed by: FAMILY MEDICINE

## 2022-12-01 SDOH — ECONOMIC STABILITY: FOOD INSECURITY: WITHIN THE PAST 12 MONTHS, THE FOOD YOU BOUGHT JUST DIDN'T LAST AND YOU DIDN'T HAVE MONEY TO GET MORE.: NEVER TRUE

## 2022-12-01 SDOH — HEALTH STABILITY: PHYSICAL HEALTH: ON AVERAGE, HOW MANY MINUTES DO YOU ENGAGE IN EXERCISE AT THIS LEVEL?: 10 MIN

## 2022-12-01 SDOH — HEALTH STABILITY: PHYSICAL HEALTH: ON AVERAGE, HOW MANY DAYS PER WEEK DO YOU ENGAGE IN MODERATE TO STRENUOUS EXERCISE (LIKE A BRISK WALK)?: 4 DAYS

## 2022-12-01 SDOH — ECONOMIC STABILITY: FOOD INSECURITY: WITHIN THE PAST 12 MONTHS, YOU WORRIED THAT YOUR FOOD WOULD RUN OUT BEFORE YOU GOT MONEY TO BUY MORE.: NEVER TRUE

## 2022-12-01 ASSESSMENT — PATIENT HEALTH QUESTIONNAIRE - PHQ9
SUM OF ALL RESPONSES TO PHQ QUESTIONS 1-9: 0
2. FEELING DOWN, DEPRESSED OR HOPELESS: 0
SUM OF ALL RESPONSES TO PHQ QUESTIONS 1-9: 0
SUM OF ALL RESPONSES TO PHQ9 QUESTIONS 1 & 2: 0
SUM OF ALL RESPONSES TO PHQ QUESTIONS 1-9: 0
1. LITTLE INTEREST OR PLEASURE IN DOING THINGS: 0
SUM OF ALL RESPONSES TO PHQ QUESTIONS 1-9: 0

## 2022-12-01 ASSESSMENT — SOCIAL DETERMINANTS OF HEALTH (SDOH)
WITHIN THE LAST YEAR, HAVE YOU BEEN AFRAID OF YOUR PARTNER OR EX-PARTNER?: NO
WITHIN THE LAST YEAR, HAVE TO BEEN RAPED OR FORCED TO HAVE ANY KIND OF SEXUAL ACTIVITY BY YOUR PARTNER OR EX-PARTNER?: NO
WITHIN THE LAST YEAR, HAVE YOU BEEN HUMILIATED OR EMOTIONALLY ABUSED IN OTHER WAYS BY YOUR PARTNER OR EX-PARTNER?: NO
WITHIN THE LAST YEAR, HAVE YOU BEEN KICKED, HIT, SLAPPED, OR OTHERWISE PHYSICALLY HURT BY YOUR PARTNER OR EX-PARTNER?: NO
HOW HARD IS IT FOR YOU TO PAY FOR THE VERY BASICS LIKE FOOD, HOUSING, MEDICAL CARE, AND HEATING?: NOT HARD AT ALL

## 2022-12-01 ASSESSMENT — ENCOUNTER SYMPTOMS
SINUS PAIN: 1
RESPIRATORY NEGATIVE: 1
GASTROINTESTINAL NEGATIVE: 1
EYES NEGATIVE: 1
SINUS PRESSURE: 1

## 2022-12-01 NOTE — PROGRESS NOTES
[address] [facility]  [address] [address]  [address] [place] [address]  Dept: [phone]  Dept Fax: [phone]  Loc: [phone]      Subjective:     Chief Complaint   Patient presents with    Establish Care       HPI:  Olga Small is a 38 y.o. female presents today tto get establish. as a new patient. She has not had a PCP in over 5 years  Pt states she recently reunited with her biological father and discovered some health issues that she may be genetically predisposed. Fhx+ dad has polycystic kidney disease, maternal aunt - + breast cancer  She does keep up with her appt with her Gyn. She had a normal pap this year. 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 She does have seasonal allergies. She also states that she occasionally have numbness in her hands that sometimes wakes her up at night. She thinks it is from the way she sleeps but it is happening a lot  more frequently now. ROS:   Review of Systems   Constitutional: Negative. HENT:  Positive for sinus pressure and sinus pain. Eyes: Negative. Respiratory: Negative. Cardiovascular: Negative. Gastrointestinal: Negative. Genitourinary: Negative. Musculoskeletal: Negative. Skin: Negative. Neurological:  Positive for numbness (upper extermities). Hematological: Negative. Psychiatric/Behavioral: Negative. PMHx:  Past Medical History:   Diagnosis Date    39 weeks gestation of pregnancy 2021     (normal spontaneous vaginal delivery)     Uterine contractions during pregnancy 2021     Patient Active Problem List   Diagnosis   (none) - all problems resolved or deleted       PSHx:  No past surgical history on file. PFHx:  No family history on file.     SocialHx:  Social History     Tobacco Use    Smoking status: Never    Smokeless tobacco: Never Substance Use Topics    Alcohol use: Not Currently       Allergies:  No Known Allergies    Medications:  Current Outpatient Medications   Medication Sig Dispense Refill    levonorgestrel (MIRENA, 52 MG,) IUD 52 mg 1 each by Intrauterine route once Indications: inserted on 06/04/2021       No current facility-administered medications for this visit. Objective:   PE:  /78   Pulse 95   Temp 98.4 °F (36.9 °C) (Temporal)   Ht 5' 4\" (1.626 m)   Wt 163 lb 9.6 oz (74.2 kg)   SpO2 100%   BMI 28.08 kg/m²   Physical Exam  Vitals and nursing note reviewed. Constitutional:       Appearance: Normal appearance. HENT:      Head: Normocephalic and atraumatic. Right Ear: External ear normal.      Left Ear: External ear normal.      Nose:      Comments: Nasal mucosa swollen, ? Nasal polyp L nostril     Mouth/Throat:      Mouth: Mucous membranes are moist.   Eyes:      General: No scleral icterus. Extraocular Movements: Extraocular movements intact. Conjunctiva/sclera: Conjunctivae normal.      Pupils: Pupils are equal, round, and reactive to light. Cardiovascular:      Rate and Rhythm: Normal rate and regular rhythm. Pulses: Normal pulses. Heart sounds: Normal heart sounds. Pulmonary:      Effort: Pulmonary effort is normal.      Breath sounds: Normal breath sounds. Abdominal:      General: Bowel sounds are normal.      Palpations: Abdomen is soft. Musculoskeletal:         General: Normal range of motion. Cervical back: Normal range of motion and neck supple. Skin:     General: Skin is warm. Findings: No rash. Neurological:      General: No focal deficit present. Mental Status: She is alert and oriented to person, place, and time. Psychiatric:         Mood and Affect: Mood normal.         Behavior: Behavior normal.          Assessment & Plan   Susan was seen today for establish care.     Diagnoses and all orders for this visit:    Establishing care with new doctor, encounter for    Nasal congestion    Seasonal allergies    Nasal polyp    Encounter for screening mammogram for malignant neoplasm of breast  -     [facility] DIGITAL SCREEN W OR WO CAD BILATERAL; Future    Family history of polycystic kidney disease  -     [facility] RENAL COMPLETE; Future    Family history of breast cancer  -     [facility] DIGITAL SCREEN W OR WO CAD BILATERAL; Future    Use nasal spray correctly  Use nasal rinse or shayy pot ad poonam    Return in about 26 days (around 12/27/2022) for adult annual physical.    All questions were answered. Medications, including possible adverse effects, and instructions were reviewed and  understanding was confirmed. Follow-up recommendations, including when to contact or return to office (ie; if symptoms worsen or fail to improve), were discussed and acknowledged.     Electronically signed by Caden Weinberg MD on 12/1/22 at 2:18 PM CST

## 2022-12-22 ENCOUNTER — HOSPITAL ENCOUNTER (OUTPATIENT)
Dept: ULTRASOUND IMAGING | Age: 42
Discharge: HOME | End: 2022-12-22
Payer: COMMERCIAL

## 2022-12-22 DIAGNOSIS — Z82.71 FAMILY HISTORY OF POLYCYSTIC KIDNEY DISEASE: ICD-10-CM

## 2022-12-22 PROCEDURE — 76770 US EXAM ABDO BACK WALL COMP: CPT

## 2022-12-30 ENCOUNTER — OFFICE VISIT (OUTPATIENT)
Dept: PRIMARY CARE CLINIC | Age: 42
End: 2022-12-30
Payer: COMMERCIAL

## 2022-12-30 VITALS
SYSTOLIC BLOOD PRESSURE: 106 MMHG | OXYGEN SATURATION: 99 % | HEART RATE: 71 BPM | HEIGHT: 63 IN | BODY MASS INDEX: 28.53 KG/M2 | TEMPERATURE: 96.6 F | WEIGHT: 161 LBS | DIASTOLIC BLOOD PRESSURE: 78 MMHG

## 2022-12-30 DIAGNOSIS — Z11.59 NEED FOR HEPATITIS C SCREENING TEST: ICD-10-CM

## 2022-12-30 DIAGNOSIS — Z13.220 SCREENING FOR HYPERLIPIDEMIA: ICD-10-CM

## 2022-12-30 DIAGNOSIS — Z23 NEED FOR INFLUENZA VACCINATION: ICD-10-CM

## 2022-12-30 DIAGNOSIS — Z23 NEED FOR COVID-19 VACCINE: ICD-10-CM

## 2022-12-30 DIAGNOSIS — Z72.89 OTHER PROBLEMS RELATED TO LIFESTYLE: ICD-10-CM

## 2022-12-30 DIAGNOSIS — Z00.00 ENCOUNTER FOR WELL ADULT EXAM WITHOUT ABNORMAL FINDINGS: Primary | ICD-10-CM

## 2022-12-30 DIAGNOSIS — Z00.00 ENCOUNTER FOR WELL ADULT EXAM WITHOUT ABNORMAL FINDINGS: ICD-10-CM

## 2022-12-30 LAB
CHOLESTEROL, TOTAL: 183 MG/DL (ref 160–199)
GLUCOSE FASTING: 95 MG/DL (ref 74–109)
HDLC SERPL-MCNC: 59 MG/DL (ref 65–121)
HEPATITIS C ANTIBODY INTERPRETATION: NORMAL
LDL CHOLESTEROL CALCULATED: 102 MG/DL
TRIGL SERPL-MCNC: 110 MG/DL (ref 0–149)

## 2022-12-30 PROCEDURE — 90471 IMMUNIZATION ADMIN: CPT | Performed by: FAMILY MEDICINE

## 2022-12-30 PROCEDURE — 0124A COVID-19, PFIZER BIVALENT BOOSTER, DO NOT DILUTE, (AGE 12Y+), IM, 30 MCG/0.3 ML: CPT | Performed by: FAMILY MEDICINE

## 2022-12-30 PROCEDURE — 91312 COVID-19, PFIZER BIVALENT BOOSTER, DO NOT DILUTE, (AGE 12Y+), IM, 30 MCG/0.3 ML: CPT | Performed by: FAMILY MEDICINE

## 2022-12-30 PROCEDURE — 90674 CCIIV4 VAC NO PRSV 0.5 ML IM: CPT | Performed by: FAMILY MEDICINE

## 2022-12-30 PROCEDURE — 99396 PREV VISIT EST AGE 40-64: CPT | Performed by: FAMILY MEDICINE

## 2022-12-30 NOTE — PROGRESS NOTES
[address] [facility]  [address] [address] [place] [address]  Dept: [phone]  Dept Fax: [phone]  Loc: [phone]      Subjective:     Chief Complaint   Patient presents with    Follow-up       HPI:  Jayshree Celis is a 38 y.o. female   presents today for her annual physical and routine preventative visit. PMHx reviewed. No significant change. Family and social history also reviewed. No recent ER or UC visit. No recent hospitalization. Chronic medications reviewed, updated and reconciled  Pt does not smoke, drink ETOH or use recreational drugs. She needs Flu and Covid booster vaccine  She is due for screening preventative labs   Overall, she feels well and healthy. ROS:   Review of Systems   Constitutional:  Negative for appetite change, chills, fatigue and fever. HENT:  Negative for congestion, ear pain, hearing loss, nosebleeds, sore throat and trouble swallowing. Eyes:  Negative for pain and visual disturbance. Respiratory:  Negative for cough, chest tightness, shortness of breath and wheezing. Cardiovascular:  Negative for chest pain, palpitations and leg swelling. Gastrointestinal:  Negative for abdominal pain, constipation, diarrhea, nausea and vomiting. Endocrine: Negative for cold intolerance, heat intolerance, polydipsia, polyphagia and polyuria. Genitourinary:  Negative for difficulty urinating, dysuria, frequency, hematuria and urgency. Musculoskeletal:  Negative for arthralgias, back pain, gait problem, joint swelling, myalgias, neck pain and neck stiffness. Skin:  Negative for pallor and rash. Allergic/Immunologic: Negative for environmental allergies and food allergies. Neurological:  Negative for dizziness, weakness and numbness. Hematological:  Negative for adenopathy. Does not bruise/bleed easily.    Psychiatric/Behavioral:  Negative for agitation, behavioral problems, decreased concentration and sleep disturbance. The patient is not nervous/anxious. PMHx:  Past Medical History:   Diagnosis Date    39 weeks gestation of pregnancy 2021     (normal spontaneous vaginal delivery)     Uterine contractions during pregnancy 2021     Patient Active Problem List   Diagnosis   (none) - all problems resolved or deleted       PSHx:  No past surgical history on file. PFHx:  No family history on file. SocialHx:  Social History     Tobacco Use    Smoking status: Never    Smokeless tobacco: Never   Substance Use Topics    Alcohol use: Not Currently       Allergies:  No Known Allergies    Medications:  Current Outpatient Medications   Medication Sig Dispense Refill    levonorgestrel (MIRENA, 52 MG,) IUD 52 mg 1 each by Intrauterine route once Indications: inserted on 2021       No current facility-administered medications for this visit. Objective:   PE:  /78   Pulse 71   Temp (!) 96.6 °F (35.9 °C) (Temporal)   Ht 5' 3\" (1.6 m)   Wt 161 lb (73 kg)   SpO2 99%   BMI 28.52 kg/m²   Physical Exam  Vitals and nursing note reviewed. Constitutional:       Appearance: Normal appearance. HENT:      Head: Normocephalic and atraumatic. Nose: Nose normal.      Mouth/Throat:      Mouth: Mucous membranes are moist.   Eyes:      Extraocular Movements: Extraocular movements intact. Conjunctiva/sclera: Conjunctivae normal.      Pupils: Pupils are equal, round, and reactive to light. Cardiovascular:      Rate and Rhythm: Normal rate and regular rhythm. Pulses: Normal pulses. Heart sounds: Normal heart sounds. Pulmonary:      Breath sounds: Normal breath sounds. Abdominal:      General: Bowel sounds are normal.      Palpations: Abdomen is soft. Musculoskeletal:         General: Normal range of motion. Cervical back: Normal range of motion and neck supple. Skin:     General: Skin is warm and dry. Neurological:      General: No focal deficit present.       Mental Status: She is alert and oriented to person, place, and time. Psychiatric:         Mood and Affect: Mood normal.          Assessment & Plan   Susan was seen today for follow-up. Diagnoses and all orders for this visit:    Encounter for well adult exam without abnormal findings  -     Glucose, Fasting; Future    Screening for hyperlipidemia  -     Lipid Panel; Future    Need for influenza vaccination  -     Influenza, FLUCELVAX, (age 10 mo+), IM, Preservative Free, 0.5 mL  -     Cancel: Influenza, FLUCELVAX, (age 10 mo+), IM, Preservative Free, 0.5 mL    Need for COVID-19 vaccine  -     COVID-19, PFIZER Bivalent BOOSTER, DO NOT Dilute, (age 12y+), IM, 30 mcg/0.3 mL    Need for hepatitis C screening test  -     Hepatitis C Antibody; Future      Return in about 1 year (around 12/30/2023) for adult annual physical.    All questions were answered. Medications, including possible adverse effects, and instructions were reviewed and  understanding was confirmed. Follow-up recommendations, including when to contact or return to office (ie; if symptoms worsen or fail to improve), were discussed and acknowledged. Electronically signed by Yana Douglas MD on 12/30/22 at 11:58 AM CST  Advance Care Planning   Advanced Care Planning: Discussed the patients choices for care and treatment in case of a health event that adversely affects decision-making abilities. Also discussed the patients long-term treatment options. Reviewed the process of designating a Health Care Surrogate as defined by the [facility]. Reviewed the [facility] Will Directive process and the kinds of life-sustaining treatments the patient would like to receive should they become terminally ill or permanently unconscious. Explained the state requirement to complete the forms in the presence of two eligible witnesses OR in the presence of a . Patient was asked to provide a copy of the signed forms to the practice office.   Time spent (minutes): 3 minutes

## 2022-12-30 NOTE — PATIENT INSTRUCTIONS
Vania Vieira re       Well Visit, Ages 25 to 72: Care Instructions  Well visits can help you stay healthy. Your doctor has checked your overall health and may have suggested ways to take good care of yourself. Your doctor also may have recommended tests. You can help prevent illness with healthy eating, good sleep, vaccinations, regular exercise, and other steps. Get the tests that you and your doctor decide on. Depending on your age and risks, examples might include screening for diabetes; hepatitis C; HIV; and cervical, breast, lung, and colon cancer. Screening helps find diseases before any symptoms appear. Eat healthy foods. Choose fruits, vegetables, whole grains, lean protein, and low-fat dairy foods. Limit saturated fat and reduce salt. Limit alcohol. Men should have no more than 2 drinks a day. Women should have no more than 1. For some people, no alcohol is the best choice. Exercise. Get at least 30 minutes of exercise on most days of the week. Walking can be a good choice. Reach and stay at your healthy weight. This will lower your risk for many health problems. Take care of your mental health. Try to stay connected with friends, family, and community, and find ways to manage stress. If you're feeling depressed or hopeless, talk to someone. A counselor can help. If you don't have a counselor, talk to your doctor. Talk to your doctor if you think you may have a problem with alcohol or drug use. This includes prescription medicines and illegal drugs. Avoid tobacco and nicotine: Don't smoke, vape, or chew. If you need help quitting, talk to your doctor. Practice safer sex. Getting tested, using condoms or dental dams, and limiting sex partners can help prevent STIs. Use birth control if it's important to you to prevent pregnancy. Talk with your doctor about your choices and what might be best for you. Prevent problems where you can.  Protect your skin from too much sun, wash your hands, brush your teeth twice a day, and wear a seat belt in the car. Where can you learn more? Go to http://www.moeller.com/ and enter P072 to learn more about \"Well Visit, Ages 25 to 72: Care Instructions. \"  Current as of: March 9, 2022               Content Version: 13.5  © 7023-0157 [employer]. Care instructions adapted under license by [facility] ([facility]). If you have questions about a medical condition or this instruction, always ask your healthcare professional. [address] any warranty or liability for your use of this information. A Healthy Lifestyle: Care Instructions  A healthy lifestyle can help you feel good, have more energy, and stay at a weight that's healthy for you. You can share a healthy lifestyle with your friends and family. And you can do it on your own. Eat meals with your friends or family. You could try cooking together. Plan activities with other people. Go for a walk with a friend, try a free online fitness class, or join a sports league. Eat a variety of healthy foods. These include fruits, vegetables, whole grains, low-fat dairy, and lean protein. Choose healthy portions of food. You can use the Nutrition Facts label on food packages as a guide. Eat more fruits and vegetables. You could add vegetables to sandwiches or add fruit to cereal.   Drink water when you are thirsty. Limit soda, juice, and sports drinks. Try to exercise most days. Aim for at least 2½ hours of exercise each week. Keep moving. Work in the garden or take your dog on a walk. Use the stairs instead of the elevator. If you use tobacco or nicotine, try to quit. Ask your doctor about programs and medicines to help you quit. Limit alcohol. Men should have no more than 2 drinks a day. Women should have no more than 1. For some people, no alcohol is the best choice. Follow-up care is a key part of your treatment and safety.  Be sure to make and go to all appointments, and call your doctor if you are having problems. It's also a good idea to know your test results and keep a list of the medicines you take. Where can you learn more? Go to http://www.moeller.com/ and enter U807 to learn more about \"A Healthy Lifestyle: Care Instructions. \"  Current as of: March 9, 2022               Content Version: 13.5  © 2006-2022 [employer]. Care instructions adapted under license by [facility] ([facility]). If you have questions about a medical condition or this instruction, always ask your healthcare professional. [address] any warranty or liability for your use of this information.

## 2022-12-30 NOTE — PROGRESS NOTES
After obtaining consent, and per orders of Dr. Binu Anderson, injection of Valencia Peter covid bivalent given in right deltoid and Flucelvax given in Right deltoid by Brandon Bruner. Patient signed consent scanned into patients chart.

## 2023-01-02 ASSESSMENT — ENCOUNTER SYMPTOMS
VOMITING: 0
NAUSEA: 0
WHEEZING: 0
COUGH: 0
CONSTIPATION: 0
EYE PAIN: 0
SORE THROAT: 0
CHEST TIGHTNESS: 0
BACK PAIN: 0
TROUBLE SWALLOWING: 0
ABDOMINAL PAIN: 0
SHORTNESS OF BREATH: 0
DIARRHEA: 0

## 2023-01-03 ENCOUNTER — TELEPHONE (OUTPATIENT)
Dept: PRIMARY CARE CLINIC | Age: 43
End: 2023-01-03

## 2023-01-03 NOTE — TELEPHONE ENCOUNTER
----- Message from Shannon Maurice MD sent at 12/30/2022  8:09 PM CST -----  Hep C screen negative  Lipid panel shows cholesterol is at goal  Fasting glucose - normal

## 2023-01-31 ENCOUNTER — HOSPITAL ENCOUNTER (OUTPATIENT)
Dept: WOMENS IMAGING | Age: 43
Discharge: HOME | End: 2023-01-31
Payer: COMMERCIAL

## 2023-01-31 DIAGNOSIS — Z80.3 FAMILY HISTORY OF BREAST CANCER: ICD-10-CM

## 2023-01-31 DIAGNOSIS — Z12.31 ENCOUNTER FOR SCREENING MAMMOGRAM FOR MALIGNANT NEOPLASM OF BREAST: ICD-10-CM

## 2023-01-31 PROCEDURE — 77067 SCR MAMMO BI INCL CAD: CPT | Performed by: RADIOLOGY

## 2023-01-31 PROCEDURE — 77063 BREAST TOMOSYNTHESIS BI: CPT

## 2023-01-31 PROCEDURE — 77063 BREAST TOMOSYNTHESIS BI: CPT | Performed by: RADIOLOGY

## 2023-02-01 ENCOUNTER — TELEPHONE (OUTPATIENT)
Dept: PRIMARY CARE CLINIC | Age: 43
End: 2023-02-01

## 2023-02-01 NOTE — TELEPHONE ENCOUNTER
----- Message from Nisa Horton MD sent at 1/31/2023  4:08 PM CST -----  Mammogram is normal. Repeat in 2 years. Continue to do self breast exam periodically and report unusual changes asap.

## 2023-11-17 ENCOUNTER — NURSE ONLY (OUTPATIENT)
Dept: PRIMARY CARE CLINIC | Age: 43
End: 2023-11-17

## 2023-11-17 VITALS
OXYGEN SATURATION: 96 % | TEMPERATURE: 97.4 F | HEIGHT: 63 IN | HEART RATE: 95 BPM | WEIGHT: 170.4 LBS | BODY MASS INDEX: 30.19 KG/M2

## 2023-11-17 DIAGNOSIS — J02.9 ACUTE PHARYNGITIS, UNSPECIFIED ETIOLOGY: ICD-10-CM

## 2023-11-17 DIAGNOSIS — Z20.818 EXPOSURE TO STREP THROAT: Primary | ICD-10-CM

## 2023-11-17 PROBLEM — O09.529 AMA (ADVANCED MATERNAL AGE) MULTIGRAVIDA 35+: Status: ACTIVE | Noted: 2020-10-06

## 2023-11-17 LAB — S PYO AG THROAT QL: NORMAL

## 2023-11-17 RX ORDER — AMOXICILLIN 500 MG/1
500 CAPSULE ORAL 2 TIMES DAILY
Qty: 20 CAPSULE | Refills: 0 | Status: SHIPPED | OUTPATIENT
Start: 2023-11-17 | End: 2023-11-27

## 2023-11-17 NOTE — PROGRESS NOTES
Pt came in with her Children to see Dr. Jagruti Lloyd. Her 3 boys are positive for Strep. Pt was swabbed, but it is negative. She does have a little soreness and she states her throat is scratchy.

## 2024-01-02 ASSESSMENT — PATIENT HEALTH QUESTIONNAIRE - PHQ9
SUM OF ALL RESPONSES TO PHQ QUESTIONS 1-9: 0
SUM OF ALL RESPONSES TO PHQ QUESTIONS 1-9: 0
1. LITTLE INTEREST OR PLEASURE IN DOING THINGS: NOT AT ALL
2. FEELING DOWN, DEPRESSED OR HOPELESS: NOT AT ALL
2. FEELING DOWN, DEPRESSED OR HOPELESS: 0
SUM OF ALL RESPONSES TO PHQ9 QUESTIONS 1 & 2: 0
SUM OF ALL RESPONSES TO PHQ QUESTIONS 1-9: 0
1. LITTLE INTEREST OR PLEASURE IN DOING THINGS: 0
SUM OF ALL RESPONSES TO PHQ QUESTIONS 1-9: 0
SUM OF ALL RESPONSES TO PHQ9 QUESTIONS 1 & 2: 0

## 2024-01-03 ENCOUNTER — OFFICE VISIT (OUTPATIENT)
Dept: PRIMARY CARE CLINIC | Age: 44
End: 2024-01-03
Payer: COMMERCIAL

## 2024-01-03 VITALS
DIASTOLIC BLOOD PRESSURE: 62 MMHG | OXYGEN SATURATION: 97 % | SYSTOLIC BLOOD PRESSURE: 108 MMHG | RESPIRATION RATE: 20 BRPM | HEIGHT: 62 IN | WEIGHT: 172.6 LBS | TEMPERATURE: 97 F | HEART RATE: 79 BPM | BODY MASS INDEX: 31.76 KG/M2

## 2024-01-03 DIAGNOSIS — Z23 FLU VACCINE NEED: ICD-10-CM

## 2024-01-03 DIAGNOSIS — Z12.31 SCREENING MAMMOGRAM FOR BREAST CANCER: ICD-10-CM

## 2024-01-03 DIAGNOSIS — Z00.00 ANNUAL PHYSICAL EXAM: Primary | ICD-10-CM

## 2024-01-03 PROCEDURE — 90471 IMMUNIZATION ADMIN: CPT | Performed by: FAMILY MEDICINE

## 2024-01-03 PROCEDURE — 90674 CCIIV4 VAC NO PRSV 0.5 ML IM: CPT | Performed by: FAMILY MEDICINE

## 2024-01-03 PROCEDURE — 99396 PREV VISIT EST AGE 40-64: CPT | Performed by: FAMILY MEDICINE

## 2024-01-03 SDOH — ECONOMIC STABILITY: FOOD INSECURITY: WITHIN THE PAST 12 MONTHS, YOU WORRIED THAT YOUR FOOD WOULD RUN OUT BEFORE YOU GOT MONEY TO BUY MORE.: NEVER TRUE

## 2024-01-03 SDOH — ECONOMIC STABILITY: HOUSING INSECURITY
IN THE LAST 12 MONTHS, WAS THERE A TIME WHEN YOU DID NOT HAVE A STEADY PLACE TO SLEEP OR SLEPT IN A SHELTER (INCLUDING NOW)?: NO

## 2024-01-03 SDOH — ECONOMIC STABILITY: FOOD INSECURITY: WITHIN THE PAST 12 MONTHS, THE FOOD YOU BOUGHT JUST DIDN'T LAST AND YOU DIDN'T HAVE MONEY TO GET MORE.: NEVER TRUE

## 2024-01-03 SDOH — ECONOMIC STABILITY: INCOME INSECURITY: HOW HARD IS IT FOR YOU TO PAY FOR THE VERY BASICS LIKE FOOD, HOUSING, MEDICAL CARE, AND HEATING?: NOT HARD AT ALL

## 2024-01-03 ASSESSMENT — ENCOUNTER SYMPTOMS
CONSTIPATION: 0
TROUBLE SWALLOWING: 0
CHEST TIGHTNESS: 0
ABDOMINAL PAIN: 0
BACK PAIN: 0
SORE THROAT: 0
DIARRHEA: 0
VOMITING: 0
WHEEZING: 0
EYE PAIN: 0
SHORTNESS OF BREATH: 0
NAUSEA: 0
COUGH: 0

## 2024-01-03 NOTE — PROGRESS NOTES
Well Adult Note  Name: Susan Echavarria Today’s Date: 2024   MRN: [id number] Sex: Female   Age: 43 y.o. Ethnicity:  /    : 1980 Race: White (non-)      Susan Echavarria  presents today for her annual physical and routine preventative visit.   PMHx reviewed. No significant change. Family and social history also reviewed. No recent ER or UC visit. No recent hospitalization.   Chronic medications reviewed, updated and reconciled  Pt does not smoke, drink ETOH or use recreational drugs.  She  is up to date on her immunizations.   She is due for screening preventative labs   Overall, she feels well and healthy.   Pt states that she is a Full Code    Review of Systems   Constitutional:  Negative for appetite change, chills, fatigue and fever.   HENT:  Negative for congestion, ear pain, hearing loss, nosebleeds, sore throat and trouble swallowing.    Eyes:  Negative for pain and visual disturbance.   Respiratory:  Negative for cough, chest tightness, shortness of breath and wheezing.    Cardiovascular:  Negative for chest pain, palpitations and leg swelling.   Gastrointestinal:  Negative for abdominal pain, constipation, diarrhea, nausea and vomiting.   Endocrine: Negative for cold intolerance, heat intolerance, polydipsia, polyphagia and polyuria.   Genitourinary:  Negative for difficulty urinating, dysuria, frequency, hematuria and urgency.   Musculoskeletal:  Negative for arthralgias, back pain, gait problem, joint swelling, myalgias, neck pain and neck stiffness.   Skin:  Negative for pallor and rash.   Allergic/Immunologic: Negative for environmental allergies and food allergies.   Neurological:  Negative for dizziness, weakness and numbness.   Hematological:  Negative for adenopathy. Does not bruise/bleed easily.   Psychiatric/Behavioral:  Negative for agitation, behavioral problems, decreased concentration and sleep disturbance. The patient is not nervous/anxious.        No Known

## 2024-01-09 DIAGNOSIS — Z00.00 ANNUAL PHYSICAL EXAM: ICD-10-CM

## 2024-01-09 LAB
25(OH)D3 SERPL-MCNC: 25.6 NG/ML
ALBUMIN SERPL-MCNC: 4.5 G/DL (ref 3.5–5.2)
ALP SERPL-CCNC: 68 U/L (ref 35–104)
ALT SERPL-CCNC: 64 U/L (ref 5–33)
ANION GAP SERPL CALCULATED.3IONS-SCNC: 9 MMOL/L (ref 7–19)
AST SERPL-CCNC: 30 U/L (ref 5–32)
BASOPHILS # BLD: 0 K/UL (ref 0–0.2)
BASOPHILS NFR BLD: 0.3 % (ref 0–1)
BILIRUB SERPL-MCNC: 0.5 MG/DL (ref 0.2–1.2)
BUN SERPL-MCNC: 10 MG/DL (ref 6–20)
CALCIUM SERPL-MCNC: 9.2 MG/DL (ref 8.6–10)
CHLORIDE SERPL-SCNC: 105 MMOL/L (ref 98–111)
CHOLEST SERPL-MCNC: 177 MG/DL (ref 160–199)
CO2 SERPL-SCNC: 29 MMOL/L (ref 22–29)
CREAT SERPL-MCNC: 0.6 MG/DL (ref 0.5–0.9)
EOSINOPHIL # BLD: 0.2 K/UL (ref 0–0.6)
EOSINOPHIL NFR BLD: 2.4 % (ref 0–5)
ERYTHROCYTE [DISTWIDTH] IN BLOOD BY AUTOMATED COUNT: 12 % (ref 11.5–14.5)
GLUCOSE SERPL-MCNC: 98 MG/DL (ref 74–109)
HCT VFR BLD AUTO: 43.2 % (ref 37–47)
HDLC SERPL-MCNC: 57 MG/DL (ref 65–121)
HGB BLD-MCNC: 14.2 G/DL (ref 12–16)
IMM GRANULOCYTES # BLD: 0 K/UL
LDLC SERPL CALC-MCNC: 100 MG/DL
LYMPHOCYTES # BLD: 1.6 K/UL (ref 1.1–4.5)
LYMPHOCYTES NFR BLD: 25.6 % (ref 20–40)
MCH RBC QN AUTO: 30.1 PG (ref 27–31)
MCHC RBC AUTO-ENTMCNC: 32.9 G/DL (ref 33–37)
MCV RBC AUTO: 91.5 FL (ref 81–99)
MONOCYTES # BLD: 0.4 K/UL (ref 0–0.9)
MONOCYTES NFR BLD: 5.7 % (ref 0–10)
NEUTROPHILS # BLD: 4 K/UL (ref 1.5–7.5)
NEUTS SEG NFR BLD: 65.8 % (ref 50–65)
PLATELET # BLD AUTO: 283 K/UL (ref 130–400)
PMV BLD AUTO: 9.6 FL (ref 9.4–12.3)
POTASSIUM SERPL-SCNC: 4.2 MMOL/L (ref 3.5–5)
PROT SERPL-MCNC: 7.8 G/DL (ref 6.6–8.7)
RBC # BLD AUTO: 4.72 M/UL (ref 4.2–5.4)
SODIUM SERPL-SCNC: 143 MMOL/L (ref 136–145)
TRIGL SERPL-MCNC: 101 MG/DL (ref 0–149)
TSH SERPL DL<=0.005 MIU/L-ACNC: 1.6 UIU/ML (ref 0.35–5.5)
WBC # BLD AUTO: 6.1 K/UL (ref 4.8–10.8)

## 2024-01-10 ENCOUNTER — TELEPHONE (OUTPATIENT)
Dept: PRIMARY CARE CLINIC | Age: 44
End: 2024-01-10

## 2024-01-10 NOTE — TELEPHONE ENCOUNTER
----- Message from Tiffany Crocker MD sent at 1/10/2024  1:33 PM CST -----  Vit D level is low - okay to take OTC Vit D supplement  2000 IU  CBC normal  Cholesterol is almost at goal - HDL (good cholesterol) is sl low  CMP normal  Thyroid function normal

## 2024-02-05 ENCOUNTER — HOSPITAL ENCOUNTER (OUTPATIENT)
Dept: WOMENS IMAGING | Age: 44
Discharge: HOME | End: 2024-02-05
Payer: COMMERCIAL

## 2024-02-05 DIAGNOSIS — Z12.31 ENCOUNTER FOR SCREENING MAMMOGRAM FOR MALIGNANT NEOPLASM OF BREAST: ICD-10-CM

## 2024-02-05 PROCEDURE — 77063 BREAST TOMOSYNTHESIS BI: CPT

## 2025-01-05 ASSESSMENT — PATIENT HEALTH QUESTIONNAIRE - PHQ9
SUM OF ALL RESPONSES TO PHQ QUESTIONS 1-9: 0
SUM OF ALL RESPONSES TO PHQ QUESTIONS 1-9: 0
2. FEELING DOWN, DEPRESSED OR HOPELESS: NOT AT ALL
SUM OF ALL RESPONSES TO PHQ QUESTIONS 1-9: 0
SUM OF ALL RESPONSES TO PHQ9 QUESTIONS 1 & 2: 0
SUM OF ALL RESPONSES TO PHQ QUESTIONS 1-9: 0
1. LITTLE INTEREST OR PLEASURE IN DOING THINGS: NOT AT ALL
2. FEELING DOWN, DEPRESSED OR HOPELESS: NOT AT ALL
1. LITTLE INTEREST OR PLEASURE IN DOING THINGS: NOT AT ALL
SUM OF ALL RESPONSES TO PHQ9 QUESTIONS 1 & 2: 0

## 2025-01-07 ENCOUNTER — OFFICE VISIT (OUTPATIENT)
Dept: PRIMARY CARE CLINIC | Age: 45
End: 2025-01-07
Payer: COMMERCIAL

## 2025-01-07 VITALS
OXYGEN SATURATION: 99 % | BODY MASS INDEX: 29.19 KG/M2 | SYSTOLIC BLOOD PRESSURE: 116 MMHG | TEMPERATURE: 97.8 F | DIASTOLIC BLOOD PRESSURE: 78 MMHG | HEIGHT: 64 IN | WEIGHT: 171 LBS | HEART RATE: 79 BPM

## 2025-01-07 DIAGNOSIS — R20.0 NUMBNESS AND TINGLING OF UPPER EXTREMITY: ICD-10-CM

## 2025-01-07 DIAGNOSIS — Z00.00 ANNUAL PHYSICAL EXAM: Primary | ICD-10-CM

## 2025-01-07 DIAGNOSIS — R20.2 NUMBNESS AND TINGLING OF UPPER EXTREMITY: ICD-10-CM

## 2025-01-07 PROCEDURE — 99396 PREV VISIT EST AGE 40-64: CPT | Performed by: FAMILY MEDICINE

## 2025-01-07 SDOH — ECONOMIC STABILITY: FOOD INSECURITY: WITHIN THE PAST 12 MONTHS, THE FOOD YOU BOUGHT JUST DIDN'T LAST AND YOU DIDN'T HAVE MONEY TO GET MORE.: NEVER TRUE

## 2025-01-07 SDOH — ECONOMIC STABILITY: FOOD INSECURITY: WITHIN THE PAST 12 MONTHS, YOU WORRIED THAT YOUR FOOD WOULD RUN OUT BEFORE YOU GOT MONEY TO BUY MORE.: NEVER TRUE

## 2025-01-07 SDOH — ECONOMIC STABILITY: INCOME INSECURITY: HOW HARD IS IT FOR YOU TO PAY FOR THE VERY BASICS LIKE FOOD, HOUSING, MEDICAL CARE, AND HEATING?: NOT HARD AT ALL

## 2025-01-07 ASSESSMENT — ENCOUNTER SYMPTOMS
NAUSEA: 0
EYE PAIN: 0
DIARRHEA: 0
SHORTNESS OF BREATH: 1
VOMITING: 0
CONSTIPATION: 0
SORE THROAT: 0
WHEEZING: 0
TROUBLE SWALLOWING: 0
BACK PAIN: 0
ABDOMINAL PAIN: 0
CHEST TIGHTNESS: 0
COUGH: 0

## 2025-01-07 NOTE — PROGRESS NOTES
[facility]  [address]  [address]  [place] [address]  Dept: [phone]  Dept Fax: [phone]  Loc: [phone]      Subjective:     Chief Complaint   Patient presents with    Annual Exam       HPI:  Susan Echavarria is a 44 y.o. female presenting for her annual physical and preventative care  She recently moved to this area last August  PMHx reviewed. She has hx of =HPV which she states she has been following up closely with her Gyn in [place].  No significant change in the last few years. Family and social history also reviewed. No recent ER or UC visit. No recent hospitalization.   Chronic medications reviewed, updated and reconciled  Pt does not smoke, drink ETOH or use recreational drugs.  She is due for screening preventative labs   Overall, she feels well and healthy.     ROS:   Review of Systems   Constitutional:  Negative for appetite change, chills, fatigue and fever.   HENT:  Negative for congestion, ear pain, hearing loss, nosebleeds, sore throat and trouble swallowing.    Eyes:  Negative for pain and visual disturbance.   Respiratory:  Positive for shortness of breath (hx of exercise induced asthma). Negative for cough, chest tightness and wheezing.    Cardiovascular:  Negative for chest pain, palpitations and leg swelling.   Gastrointestinal:  Negative for abdominal pain, constipation, diarrhea, nausea and vomiting.   Endocrine: Negative for cold intolerance, heat intolerance, polydipsia, polyphagia and polyuria.   Genitourinary:  Negative for difficulty urinating, dysuria, frequency, hematuria and urgency.   Musculoskeletal:  Negative for arthralgias, back pain, gait problem, joint swelling, myalgias, neck pain and neck stiffness.   Skin:  Negative for pallor and rash.   Allergic/Immunologic: Negative for environmental allergies and food allergies.   Neurological:  Positive for numbness (and tinglin both arms). Negative for dizziness and

## 2025-01-09 DIAGNOSIS — Z00.00 ANNUAL PHYSICAL EXAM: ICD-10-CM

## 2025-01-09 LAB
25(OH)D3 SERPL-MCNC: 24.9 NG/ML
ALBUMIN SERPL-MCNC: 4.5 G/DL (ref 3.5–5.2)
ALP SERPL-CCNC: 69 U/L (ref 35–104)
ALT SERPL-CCNC: 32 U/L (ref 5–33)
ANION GAP SERPL CALCULATED.3IONS-SCNC: 14 MMOL/L (ref 7–19)
AST SERPL-CCNC: 19 U/L (ref 5–32)
BASOPHILS # BLD: 0 K/UL (ref 0–0.2)
BASOPHILS NFR BLD: 0.3 % (ref 0–1)
BILIRUB SERPL-MCNC: 0.5 MG/DL (ref 0.2–1.2)
BUN SERPL-MCNC: 12 MG/DL (ref 6–20)
CALCIUM SERPL-MCNC: 9.3 MG/DL (ref 8.6–10)
CHLORIDE SERPL-SCNC: 103 MMOL/L (ref 98–111)
CHOLEST SERPL-MCNC: 183 MG/DL (ref 0–199)
CO2 SERPL-SCNC: 23 MMOL/L (ref 22–29)
CREAT SERPL-MCNC: 0.7 MG/DL (ref 0.5–0.9)
EOSINOPHIL # BLD: 0.1 K/UL (ref 0–0.6)
EOSINOPHIL NFR BLD: 1.7 % (ref 0–5)
ERYTHROCYTE [DISTWIDTH] IN BLOOD BY AUTOMATED COUNT: 11.9 % (ref 11.5–14.5)
GLUCOSE SERPL-MCNC: 98 MG/DL (ref 70–99)
HCT VFR BLD AUTO: 42.4 % (ref 37–47)
HDLC SERPL-MCNC: 57 MG/DL (ref 40–60)
HGB BLD-MCNC: 13.6 G/DL (ref 12–16)
IMM GRANULOCYTES # BLD: 0 K/UL
LDLC SERPL CALC-MCNC: 106 MG/DL
LYMPHOCYTES # BLD: 2.1 K/UL (ref 1.1–4.5)
LYMPHOCYTES NFR BLD: 29.2 % (ref 20–40)
MCH RBC QN AUTO: 29.4 PG (ref 27–31)
MCHC RBC AUTO-ENTMCNC: 32.1 G/DL (ref 33–37)
MCV RBC AUTO: 91.6 FL (ref 81–99)
MONOCYTES # BLD: 0.5 K/UL (ref 0–0.9)
MONOCYTES NFR BLD: 6.6 % (ref 0–10)
NEUTROPHILS # BLD: 4.5 K/UL (ref 1.5–7.5)
NEUTS SEG NFR BLD: 61.6 % (ref 50–65)
PLATELET # BLD AUTO: 285 K/UL (ref 130–400)
PMV BLD AUTO: 9.6 FL (ref 9.4–12.3)
POTASSIUM SERPL-SCNC: 4.3 MMOL/L (ref 3.5–5)
PROT SERPL-MCNC: 7.7 G/DL (ref 6.4–8.3)
RBC # BLD AUTO: 4.63 M/UL (ref 4.2–5.4)
SODIUM SERPL-SCNC: 140 MMOL/L (ref 136–145)
T3FREE SERPL-MCNC: 3 PG/ML (ref 2–4.4)
T4 FREE SERPL-MCNC: 0.96 NG/DL (ref 0.93–1.7)
TRIGL SERPL-MCNC: 101 MG/DL (ref 0–149)
TSH SERPL DL<=0.005 MIU/L-ACNC: 1.49 UIU/ML (ref 0.27–4.2)
WBC # BLD AUTO: 7.3 K/UL (ref 4.8–10.8)

## 2025-01-27 ENCOUNTER — OFFICE VISIT (OUTPATIENT)
Dept: OBGYN CLINIC | Age: 45
End: 2025-01-27
Payer: COMMERCIAL

## 2025-01-27 VITALS
HEART RATE: 74 BPM | SYSTOLIC BLOOD PRESSURE: 114 MMHG | BODY MASS INDEX: 29.71 KG/M2 | WEIGHT: 174 LBS | HEIGHT: 64 IN | DIASTOLIC BLOOD PRESSURE: 73 MMHG

## 2025-01-27 DIAGNOSIS — Z11.51 SCREENING FOR HPV (HUMAN PAPILLOMAVIRUS): ICD-10-CM

## 2025-01-27 DIAGNOSIS — N89.8 VAGINAL DISCHARGE: ICD-10-CM

## 2025-01-27 DIAGNOSIS — Z01.419 WELL WOMAN EXAM WITH ROUTINE GYNECOLOGICAL EXAM: Primary | ICD-10-CM

## 2025-01-27 DIAGNOSIS — Z12.4 SCREENING FOR CERVICAL CANCER: ICD-10-CM

## 2025-01-27 DIAGNOSIS — Z30.431 INTRAUTERINE DEVICE SURVEILLANCE: ICD-10-CM

## 2025-01-27 DIAGNOSIS — Z12.31 ENCOUNTER FOR SCREENING MAMMOGRAM FOR BREAST CANCER: ICD-10-CM

## 2025-01-27 PROBLEM — O09.529 AMA (ADVANCED MATERNAL AGE) MULTIGRAVIDA 35+: Status: RESOLVED | Noted: 2020-10-06 | Resolved: 2025-01-27

## 2025-01-27 PROCEDURE — 99396 PREV VISIT EST AGE 40-64: CPT | Performed by: NURSE PRACTITIONER

## 2025-01-27 SDOH — ECONOMIC STABILITY: FOOD INSECURITY: WITHIN THE PAST 12 MONTHS, YOU WORRIED THAT YOUR FOOD WOULD RUN OUT BEFORE YOU GOT MONEY TO BUY MORE.: NEVER TRUE

## 2025-01-27 SDOH — ECONOMIC STABILITY: FOOD INSECURITY: WITHIN THE PAST 12 MONTHS, THE FOOD YOU BOUGHT JUST DIDN'T LAST AND YOU DIDN'T HAVE MONEY TO GET MORE.: NEVER TRUE

## 2025-01-27 ASSESSMENT — ENCOUNTER SYMPTOMS
EYES NEGATIVE: 1
GASTROINTESTINAL NEGATIVE: 1
ALLERGIC/IMMUNOLOGIC NEGATIVE: 1
RESPIRATORY NEGATIVE: 1

## 2025-01-27 NOTE — PROGRESS NOTES
Right: No mass, tenderness or fullness.          Left: No mass, tenderness or fullness.        Rectum: Normal. No mass, anal fissure or external hemorrhoid.      Comments: Pap collected for cervical cytology; iud strings noted at cervical os  Musculoskeletal:         General: No tenderness. Normal range of motion.      Cervical back: Normal range of motion and neck supple.   Lymphadenopathy:      Cervical:      Right cervical: No superficial, deep or posterior cervical adenopathy.     Left cervical: No superficial, deep or posterior cervical adenopathy.      Upper Body:      Right upper body: No supraclavicular, pectoral or epitrochlear adenopathy.      Left upper body: No supraclavicular, pectoral or epitrochlear adenopathy.      Lower Body: No right inguinal adenopathy. No left inguinal adenopathy.   Skin:     General: Skin is warm and dry.      Capillary Refill: Capillary refill takes 2 to 3 seconds.      Findings: No rash.   Neurological:      Mental Status: She is alert and oriented to person, place, and time. She is not disoriented.      Sensory: No sensory deficit.      Coordination: Coordination normal.      Gait: Gait normal.      Deep Tendon Reflexes: Reflexes are normal and symmetric.   Psychiatric:         Speech: Speech normal.         Behavior: Behavior normal.         Thought Content: Thought content normal.         Judgment: Judgment normal.           Diagnosis Orders   1. Well woman exam with routine gynecological exam        2. Screening for cervical cancer  PAP SMEAR      3. Screening for HPV (human papillomavirus)  Human papillomavirus (HPV) DNA Probe Thin Prep High Risk      4. Encounter for screening mammogram for breast cancer  [facility] DIGITAL SCREEN W OR WO CAD BILATERAL      5. Intrauterine device surveillance        6. Vaginal discharge            MEDICATIONS:  No orders of the defined types were placed in this encounter.      ORDERS:  Orders Placed This Encounter   Procedures    [facility] DIGITAL

## 2025-01-30 LAB
HPV HR 12 DNA SPEC QL NAA+PROBE: NOT DETECTED
HPV16 DNA SPEC QL NAA+PROBE: NOT DETECTED
HPV16+18+H RISK 12 DNA SPEC-IMP: NORMAL
HPV18 DNA SPEC QL NAA+PROBE: NOT DETECTED

## 2025-04-02 ENCOUNTER — TELEPHONE (OUTPATIENT)
Dept: OTHER | Age: 45
End: 2025-04-02

## 2025-04-02 ENCOUNTER — RESULTS FOLLOW-UP (OUTPATIENT)
Dept: OBGYN CLINIC | Age: 45
End: 2025-04-02

## 2025-04-02 ENCOUNTER — HOSPITAL ENCOUNTER (OUTPATIENT)
Dept: WOMENS IMAGING | Age: 45
Discharge: HOME | End: 2025-04-02
Payer: COMMERCIAL

## 2025-04-02 VITALS — WEIGHT: 173 LBS | HEIGHT: 63 IN | BODY MASS INDEX: 30.65 KG/M2

## 2025-04-02 DIAGNOSIS — Z12.31 ENCOUNTER FOR SCREENING MAMMOGRAM FOR BREAST CANCER: ICD-10-CM

## 2025-04-02 DIAGNOSIS — Z80.3 FAMILY HISTORY OF MALIGNANT NEOPLASM OF BREAST: ICD-10-CM

## 2025-04-02 DIAGNOSIS — Z80.49 FAMILY HISTORY OF MALIGNANT NEOPLASM OF ENDOMETRIUM: ICD-10-CM

## 2025-04-02 PROCEDURE — 77063 BREAST TOMOSYNTHESIS BI: CPT

## 2025-04-02 NOTE — TELEPHONE ENCOUNTER
Patient recently was identified as having a significant personal and/or family history of cancer.  [facility].  We discussed in detail their personal/family history of cancer. Patient will be tested for 81 gene mutations. Once sample is submitted and the lab receives it, it will take 3-4 weeks to get results.  Pt will have blood collected at [facility] outpatient lab.  All questions answered.

## 2025-04-16 ENCOUNTER — TELEPHONE (OUTPATIENT)
Dept: OTHER | Age: 45
End: 2025-04-16

## 2025-04-16 LAB
Lab: NEGATIVE
Lab: NORMAL

## 2025-04-17 NOTE — TELEPHONE ENCOUNTER
Completed a post-test results disclosure discussion with patient.  The patient's testing was Negative for a clinically actionable gene mutation.    However, the patients Tyrer-Cuzick score was greater than 20%.    This indicates that the patient is considered HIGH RISK.    The following are recommendations and management guidelines for a TC score greater than 20%.        Patient voiced understanding.  Patient was offered a referral to our High Risk Breast Program and breast health management with Jesse Morales PA-C.  Pt will discuss with her  and call back if she opts to be scheduled.    Appointment made for: considering  TC Score: 21.4%  Last mammogram: 4/2/2025, BIRADS 1  A copy of this report and elevated Risk Score can be viewed under the Lab tab.      Patient did have one or more Variant(s) of Uncertain Significance (VUS) ALK, EXT2. A variant of uncertain significance (VUS) means that a change in the DNA was detected, but there is not enough information to know if the change increases the risk of cancer or not. A VUS should not be used to make clinical decisions. As more evidence becomes available over  time, the lab may reclassify a VUS. Many VUS are reclassified as “likely benign,” meaning that they do not increase the risk for cancer. Bernie will reach out to the ordering provider if or when the VUS is reclassified. Patient voiced understanding of this.

## 2025-04-21 ENCOUNTER — RESULTS FOLLOW-UP (OUTPATIENT)
Dept: OBGYN CLINIC | Age: 45
End: 2025-04-21

## 2025-05-29 ENCOUNTER — TELEPHONE (OUTPATIENT)
Dept: PRIMARY CARE CLINIC | Age: 45
End: 2025-05-29

## 2025-08-03 ENCOUNTER — OFFICE VISIT (OUTPATIENT)
Age: 45
End: 2025-08-03

## 2025-08-03 VITALS
RESPIRATION RATE: 20 BRPM | SYSTOLIC BLOOD PRESSURE: 118 MMHG | TEMPERATURE: 98.9 F | OXYGEN SATURATION: 98 % | DIASTOLIC BLOOD PRESSURE: 72 MMHG | BODY MASS INDEX: 30.65 KG/M2 | HEART RATE: 98 BPM | WEIGHT: 173 LBS

## 2025-08-03 DIAGNOSIS — J02.9 PHARYNGITIS, UNSPECIFIED ETIOLOGY: Primary | ICD-10-CM

## 2025-08-03 DIAGNOSIS — Z20.818 EXPOSURE TO STREPTOCOCCAL PHARYNGITIS: ICD-10-CM

## 2025-08-03 DIAGNOSIS — J02.9 SORE THROAT: ICD-10-CM

## 2025-08-03 LAB — S PYO AG THROAT QL: NORMAL

## 2025-08-08 DIAGNOSIS — J03.00 ACUTE NON-RECURRENT STREPTOCOCCAL TONSILLITIS: Primary | ICD-10-CM

## 2025-08-08 RX ORDER — AZITHROMYCIN 250 MG/1
TABLET, FILM COATED ORAL
Qty: 6 TABLET | Refills: 0 | Status: SHIPPED | OUTPATIENT
Start: 2025-08-08 | End: 2025-08-18